# Patient Record
Sex: MALE | Race: WHITE | NOT HISPANIC OR LATINO | Employment: OTHER | URBAN - METROPOLITAN AREA
[De-identification: names, ages, dates, MRNs, and addresses within clinical notes are randomized per-mention and may not be internally consistent; named-entity substitution may affect disease eponyms.]

---

## 2017-01-24 NOTE — TELEPHONE ENCOUNTER
Was the patient seen in the last year in this department? Yes     Does patient have an active prescription for medications requested? Yes     Received Request Via: Pharmacy    Last office visit: 03/31/2016  Last labs: 06/23/2016

## 2017-01-24 NOTE — TELEPHONE ENCOUNTER
Gabapentin    Was the patient seen in the last year in this department? Yes  (Last 7/19/16)  Does patient have an active prescription for medications requested? No     Received Request Via: Pharmacy

## 2017-01-27 RX ORDER — GABAPENTIN 300 MG/1
CAPSULE ORAL
Qty: 180 CAP | Refills: 3 | Status: SHIPPED | OUTPATIENT
Start: 2017-01-27 | End: 2017-05-10

## 2017-01-27 RX ORDER — METFORMIN HYDROCHLORIDE 500 MG/1
TABLET, EXTENDED RELEASE ORAL
Qty: 360 TAB | Refills: 3 | Status: SHIPPED | OUTPATIENT
Start: 2017-01-27 | End: 2017-05-10

## 2017-01-30 ENCOUNTER — TELEPHONE (OUTPATIENT)
Dept: MEDICAL GROUP | Facility: MEDICAL CENTER | Age: 57
End: 2017-01-30

## 2017-01-30 NOTE — TELEPHONE ENCOUNTER
----- Message from Joy Fink PA-C sent at 1/28/2017  1:25 PM PST -----  Please inform pt that he is due for f/u.   We can discuss labs further at his f/u visit.   But in short his cholesterol has increased and his A1c has decreased into a controlled range.   Please schedule a long appt for him so we can review all his chronic conditions.   Thank you  Joy

## 2017-05-10 ENCOUNTER — OFFICE VISIT (OUTPATIENT)
Dept: MEDICAL GROUP | Facility: MEDICAL CENTER | Age: 57
End: 2017-05-10
Payer: COMMERCIAL

## 2017-05-10 ENCOUNTER — NON-PROVIDER VISIT (OUTPATIENT)
Dept: MEDICAL GROUP | Facility: MEDICAL CENTER | Age: 57
End: 2017-05-10
Payer: COMMERCIAL

## 2017-05-10 VITALS
OXYGEN SATURATION: 94 % | DIASTOLIC BLOOD PRESSURE: 62 MMHG | TEMPERATURE: 97.9 F | HEART RATE: 80 BPM | RESPIRATION RATE: 16 BRPM | HEIGHT: 70 IN | WEIGHT: 216.49 LBS | SYSTOLIC BLOOD PRESSURE: 122 MMHG | BODY MASS INDEX: 30.99 KG/M2

## 2017-05-10 DIAGNOSIS — B35.1 ONYCHOMYCOSIS: ICD-10-CM

## 2017-05-10 DIAGNOSIS — E11.40 TYPE 2 DIABETES MELLITUS WITH DIABETIC NEUROPATHY, WITHOUT LONG-TERM CURRENT USE OF INSULIN (HCC): ICD-10-CM

## 2017-05-10 DIAGNOSIS — Z13.0 SCREENING, ANEMIA, DEFICIENCY, IRON: ICD-10-CM

## 2017-05-10 DIAGNOSIS — E78.5 HYPERLIPIDEMIA, UNSPECIFIED HYPERLIPIDEMIA TYPE: ICD-10-CM

## 2017-05-10 PROCEDURE — 92250 FUNDUS PHOTOGRAPHY W/I&R: CPT | Performed by: PHYSICIAN ASSISTANT

## 2017-05-10 PROCEDURE — 99214 OFFICE O/P EST MOD 30 MIN: CPT | Performed by: PHYSICIAN ASSISTANT

## 2017-05-10 ASSESSMENT — PAIN SCALES - GENERAL: PAINLEVEL: NO PAIN

## 2017-05-10 ASSESSMENT — PATIENT HEALTH QUESTIONNAIRE - PHQ9: CLINICAL INTERPRETATION OF PHQ2 SCORE: 0

## 2017-05-10 NOTE — ASSESSMENT & PLAN NOTE
Results for GRICELDA VELASCO (MRN [id number]) as of 5/10/2017 14:01   Ref. Range 1/25/2017 00:00   Cholesterol,Tot Latest Ref Range: 100-199 mg/dL 194   Triglycerides Latest Ref Range: 0-149 mg/dL 122   HDL Latest Ref Range: >39 mg/dL 42   LDL Latest Ref Range: 0-99 mg/dL 128 (H)   Since starting simvastatin- denies any muscle cramping but he is not sure if he is still taking the simvastatin.   Denies sob, cp, lt headedness  Denies tobacco use  Etoh- weekdays- 1 daily, weekend- 6-12 drinks.

## 2017-05-10 NOTE — MR AVS SNAPSHOT
Luiz Melvin   5/10/2017 2:45 PM   Non-Provider Visit   MRN: [id number]    Department:  [facility]   Dept Phone:  [phone]    Description:  Male : 1960   Provider:  [facility]           Reason for Visit     Eye Exam           Allergies as of 5/10/2017     No Known Allergies      Vital Signs     Smoking Status                   Never Smoker            Basic Information     Date Of Birth Sex Race Ethnicity Preferred Language    1960 Male White Non- English      Problem List              ICD-10-CM Priority Class Noted - Resolved    Cataracts, bilateral H26.9   10/13/2014 - Present    Type 2 diabetes mellitus with diabetic neuropathy (CMS-HCC) E11.40   10/13/2014 - Present    Abnormal EKG R94.31   2015 - Present    Hyperlipemia E78.5   10/27/2015 - Present    BMI 30.0-30.9,adult Z68.30   2016 - Present    Onychomycosis B35.1   5/10/2017 - Present      Health Maintenance        Date Due Completion Dates    IMM HEP B VACCINE (1 of 3 - Primary Series) 1960 ---    RETINAL SCREENING 1977 ---    IMM DTaP/Tdap/Td Vaccine (1 - Tdap) 1978 ---    IMM PNEUMOCOCCAL 19-64 (ADULT) MEDIUM RISK SERIES (1 of 1 - PPSV23) 1978 ---    DIABETES MONOFILAMENT / LE EXAM 10/27/2016 10/27/2015    A1C SCREENING 2017, 2016, 2016, 2015    FASTING LIPID PROFILE 2018, 2016, 2015, 3/16/2013    URINE ACR / MICROALBUMIN 2018, 2016, 2015, 3/16/2013    SERUM CREATININE 2018, 2016, 2016, 2015, 2015, 3/16/2013    COLONOSCOPY 2019 (Prv Comp)    Override on 2014: Previously completed            Current Immunizations     No immunizations on file.      Below and/or attached are the medications your provider expects you to take. Review all of your home medications and newly ordered medications with your provider and/or pharmacist. Follow medication instructions  as directed by your provider and/or pharmacist. Please keep your medication list with you and share with your provider. Update the information when medications are discontinued, doses are changed, or new medications (including over-the-counter products) are added; and carry medication information at all times in the event of emergency situations     Allergies:  No Known Allergies          Medications  Valid as of: May 10, 2017 -  3:40 PM    Generic Name Brand Name Tablet Size Instructions for use    Aspirin (Tablet Delayed Response) ECOTRIN 81 MG Take 162 mg by mouth every day.        Blood Glucose Monitoring Suppl   by Does not apply route. Accu Check Daija        Blood Glucose Monitoring Suppl (Misc) Blood Glucose Monitoring Suppl SUPPLIES Test strips order: Test strips for Accucheck Daija meter. Sig: use qd and prn ssx high or low sugar #100 RF x 3        Gabapentin (Cap) NEURONTIN 300 MG TAKE 2 CAPS BY MOUTH EVERY BEDTIME.        GlipiZIDE (Tab) GLUCOTROL 5 MG TAKE 1 TAB BY MOUTH 2 TIMES A DAY.        Lancets (Misc) Lancets  Lancets order: Lancets for Accucheck Daija meter. Sig: use qd and prn ssx high or low sugar. #100 RF x 3        MetFORMIN HCl (TABLET SR 24 HR) GLUCOPHAGE  MG Take 2 Tabs by mouth 2 times a day.        Multiple Vitamins-Minerals   Take  by mouth every day.        Simvastatin (Tab) ZOCOR 20 MG TAKE 1 TAB BY MOUTH EVERY EVENING.        SITagliptin Phosphate (Tab) JANUVIA 100 MG Take 1 Tab by mouth every day.        .                 Medicines prescribed today were sent to:     [id number] - [place], [place] - [address]    [address] [facility] [address]    Phone: [phone] Fax: [phone]    Open 24 Hours?: No    [id number] - [place], [place] - [address]    [address] [facility] [address]    Phone: [phone] Fax: [phone]    Open 24 Hours?: No      Medication refill instructions:       If your prescription bottle indicates you have  medication refills left, it is not necessary to call your provider’s office. Please contact your pharmacy and they will refill your medication.    If your prescription bottle indicates you do not have any refills left, you may request refills at any time through one of the following ways: The online [employer] system (except Urgent Care), by calling your provider’s office, or by asking your pharmacy to contact your provider’s office with a refill request. Medication refills are processed only during regular business hours and may not be available until the next business day. Your provider may request additional information or to have a follow-up visit with you prior to refilling your medication.   *Please Note: Medication refills are assigned a new Rx number when refilled electronically. Your pharmacy may indicate that no refills were authorized even though a new prescription for the same medication is available at the pharmacy. Please request the medicine by name with the pharmacy before contacting your provider for a refill.           [employer] Access Code: Activation code not generated  Current [employer] Status: Active

## 2017-05-10 NOTE — ASSESSMENT & PLAN NOTE
Last A1c: 6.4 1/2017  Medications: metformin 1000mg BID, januvia 100mg qd, glipizide 5mg bid, gabapentin 300mg bid for neuropathy.   ACE: none  Statin: has simvastatin listed but will double check this at home.   ASA: 81 mg qd.   Diet: eggs or smoothie, low carb toast, L- bulk fruits and veggies, D- meat loaf to steak, chicken, some fish. Veggies (has garden)   Exercise: none  Checks feet: yes, no sores  Eye exam: 1 year ago, denies retinopathy  Kidney function:  1/2017  Last microalbumin creatine urine ratio: 3.0 1/2017  Denies polyuria, polydipsia,   + numbness and tingling in extremities  Needs vaccine: has record at home and will be dropping this off.

## 2017-05-10 NOTE — PROGRESS NOTES
Subjective:     Chief Complaint   Patient presents with   • Follow-Up     DM T2 labs med refills     Luiz Velasco is a 57 y.o. male here today for DM, cholesterol and toe nail fungus as listed below    Type 2 diabetes mellitus with diabetic neuropathy  Last A1c: 6.4 1/2017  Medications: metformin 1000mg BID, januvia 100mg qd, glipizide 5mg bid, gabapentin 300mg bid for neuropathy.   ACE: none  Statin: has simvastatin listed but will double check this at home.   ASA: 81 mg qd.   Diet: eggs or smoothie, low carb toast, L- bulk fruits and veggies, D- meat loaf to steak, chicken, some fish. Veggies (has garden)   Exercise: none  Checks feet: yes, no sores  Eye exam: 1 year ago, denies retinopathy  Kidney function:  1/2017  Last microalbumin creatine urine ratio: 3.0 1/2017  Denies polyuria, polydipsia,   + numbness and tingling in extremities  Needs vaccine: has record at home and will be dropping this off.     Hyperlipemia  Results for LUIZ VELASCO (MRN [id number]) as of 5/10/2017 14:01   Ref. Range 1/25/2017 00:00   Cholesterol,Tot Latest Ref Range: 100-199 mg/dL 194   Triglycerides Latest Ref Range: 0-149 mg/dL 122   HDL Latest Ref Range: >39 mg/dL 42   LDL Latest Ref Range: 0-99 mg/dL 128 (H)   Since starting simvastatin- denies any muscle cramping but he is not sure if he is still taking the simvastatin.   Denies sob, cp, lt headedness  Denies tobacco use  Etoh- weekdays- 1 daily, weekend- 6-12 drinks.     Onychomycosis  Only on a couple toe nails but present several years.   denies redness, swelling, abrasions of toes or feet.   Has tried one powder that OTC- didn't help.   Would like to stick to OTC stuff at this point.        Current medicines (including changes today)  Current Outpatient Prescriptions   Medication Sig Dispense Refill   • glipiZIDE (GLUCOTROL) 5 MG Tab TAKE 1 TAB BY MOUTH 2 TIMES A DAY. 180 Tab 0   • gabapentin (NEURONTIN) 300 MG Cap TAKE 2 CAPS BY MOUTH EVERY BEDTIME. 180 Cap 0   •  "sitagliptin (JANUVIA) 100 MG Tab Take 1 Tab by mouth every day. 90 Tab 1   • metformin ER (GLUCOPHAGE XR) 500 MG TABLET SR 24 HR Take 2 Tabs by mouth 2 times a day. 360 Tab 1   • Blood Glucose Monitoring Suppl SUPPLIES Misc Test strips order: Test strips for Accucheck Daija meter. Sig: use qd and prn ssx high or low sugar #100 RF x 3 100 Strip 3   • Lancets Misc Lancets order: Lancets for Accucheck Daija meter. Sig: use qd and prn ssx high or low sugar. #100 RF x 3 100 Each 3   • aspirin EC (ECOTRIN) 81 MG TBEC Take 162 mg by mouth every day.     • Multiple Vitamins-Minerals (MULTIVITAMIN PO) Take  by mouth every day.     • Blood Glucose Monitoring Suppl (BLOOD GLUCOSE METER) by Does not apply route. Accu Check Daija     • simvastatin (ZOCOR) 20 MG Tab TAKE 1 TAB BY MOUTH EVERY EVENING. 30 Tab 11     No current facility-administered medications for this visit.     He  has a past medical history of Diabetes; Hyperlipidemia; Vitamin D insufficiency; and Neuropathy (CMS-HCC).    ROS   No chest pain, no shortness of breath, no abdominal pain       Objective:     Blood pressure 122/62, pulse 80, temperature 36.6 °C (97.9 °F), resp. rate 16, height 1.778 m (5' 10\"), weight 98.2 kg (216 lb 7.9 oz), SpO2 94 %. Body mass index is 31.06 kg/(m^2).   Physical Exam:  Alert, oriented in no acute distress.  Eye contact is good, speech goal directed, affect calm  HEENT: conjunctiva non-injected, sclera non-icteric, PERRL.  Pinna normal. TM pearly gray.   Oral mucous membranes pink and moist with no lesions.  Neck No adenopathy or masses in the neck or supraclavicular regions.  Lungs: clear to auscultation bilaterally with good excursion.  CV: regular rate and rhythm.  Abdomen: soft, nontender, no HSM, No CVAT  Ext: no edema, color normal, peripheral pulses 2+, temperature normal, 2 nails on right and 3 nails on left with mildly yellowish crumbling no erythema, edema, skin intact.        Assessment and Plan:   The following " treatment plan was discussed     1. Type 2 diabetes mellitus with diabetic neuropathy, without long-term current use of insulin (CMS-[facility])  Controlled, continue current regiment, labs ordered. (pt missed his last appt 2/2 our office rescheduling therefore hasn't had his labs done recently)   Getting retinal exam done today.   - POCT Retinal Eye Exam  - COMP METABOLIC PANEL; Future  - HEMOGLOBIN A1C; Future  - MICROALBUMIN CREAT RATIO URINE; Future  - LIPID PROFILE; Future    2. Hyperlipidemia, unspecified hyperlipidemia type  Uncontrolled, patient will be checking at home to see if he is still taking simvastatin or not. He is unsure at this point in time.  - COMP METABOLIC PANEL; Future  - LIPID PROFILE; Future    3. Screening, anemia, deficiency, iron  Labs ordered, will call for results  - CBC WITH DIFFERENTIAL; Future    4. Onychomycosis  Couple toenails, discussed OTC treatments and that is all that patient would like to do.  Recommend changing socks twice daily, using tea tree oil and or Vicks vapor rub, powdering shoes, getting new shoes annually, keeping toes filed vertically and horizontally.     Followup: Return 3-6 mo, for chronic conditions.           Please note that this dictation was created using voice recognition software. I have made every reasonable attempt to correct obvious errors, but I expect that there are errors of grammar and possibly content that I did not discover before finalizing the note.

## 2017-05-10 NOTE — MR AVS SNAPSHOT
"        Luiz Melvin   5/10/2017 1:40 PM   Office Visit   MRN: [id number]    Department:  [place] [address]   Dept Phone:  [phone]    Description:  Male : 1960   Provider:  Joy Fink PA-C           Reason for Visit     Follow-Up DM T2 labs med refills      Allergies as of 5/10/2017     No Known Allergies      You were diagnosed with     Type 2 diabetes mellitus with diabetic neuropathy, without long-term current use of insulin (CMS-HCC)   [1397726]       Hyperlipidemia, unspecified hyperlipidemia type   [4631679]       Screening, anemia, deficiency, iron   [420593]       Onychomycosis   [[id number]]         Vital Signs     Blood Pressure Pulse Temperature Respirations Height Weight    122/62 mmHg 80 36.6 °C (97.9 °F) 16 1.778 m (5' 10\") 98.2 kg (216 lb 7.9 oz)    Body Mass Index Oxygen Saturation Smoking Status             31.06 kg/m2 94% Never Smoker          Basic Information     Date Of Birth Sex Race Ethnicity Preferred Language    1960 Male White Non- English      Your appointments     May 10, 2017  2:45 PM   Non Provider 1 with [facility] [place]    [address]  [facility] [address] [phone]           You will be receiving a confirmation call a few days before your appointment from our automated call confirmation system.              Problem List              ICD-10-CM Priority Class Noted - Resolved    Cataracts, bilateral H26.9   10/13/2014 - Present    Type 2 diabetes mellitus with diabetic neuropathy (CMS-HCC) E11.40   10/13/2014 - Present    Abnormal EKG R94.31   2015 - Present    Hyperlipemia E78.5   10/27/2015 - Present    BMI 30.0-30.9,adult Z68.30   2016 - Present    Onychomycosis B35.1   5/10/2017 - Present      Health Maintenance        Date Due Completion Dates    IMM HEP B VACCINE (1 of 3 - Primary Series) 1960 ---    RETINAL SCREENING 1977 ---    IMM DTaP/Tdap/Td Vaccine (1 - Tdap) " 5/5/1978 ---    IMM PNEUMOCOCCAL 19-64 (ADULT) MEDIUM RISK SERIES (1 of 1 - PPSV23) 5/5/1978 ---    DIABETES MONOFILAMENT / LE EXAM 10/27/2016 10/27/2015    A1C SCREENING 7/25/2017 1/25/2017, 6/23/2016, 2/22/2016, 9/11/2015    FASTING LIPID PROFILE 1/25/2018 1/25/2017, 2/22/2016, 9/11/2015, 3/16/2013    URINE ACR / MICROALBUMIN 1/25/2018 1/25/2017, 6/23/2016, 9/11/2015, 3/16/2013    SERUM CREATININE 1/25/2018 1/25/2017, 6/23/2016, 2/22/2016, 9/11/2015, 6/9/2015, 3/16/2013    COLONOSCOPY 9/13/2019 9/13/2014 (Prv Comp)    Override on 9/13/2014: Previously completed            Current Immunizations     No immunizations on file.      Below and/or attached are the medications your provider expects you to take. Review all of your home medications and newly ordered medications with your provider and/or pharmacist. Follow medication instructions as directed by your provider and/or pharmacist. Please keep your medication list with you and share with your provider. Update the information when medications are discontinued, doses are changed, or new medications (including over-the-counter products) are added; and carry medication information at all times in the event of emergency situations     Allergies:  No Known Allergies          Medications  Valid as of: May 10, 2017 -  2:41 PM    Generic Name Brand Name Tablet Size Instructions for use    Aspirin (Tablet Delayed Response) ECOTRIN 81 MG Take 162 mg by mouth every day.        Blood Glucose Monitoring Suppl   by Does not apply route. Accu Check Daija        Blood Glucose Monitoring Suppl (Misc) Blood Glucose Monitoring Suppl SUPPLIES Test strips order: Test strips for Accucheck Daija meter. Sig: use qd and prn ssx high or low sugar #100 RF x 3        Gabapentin (Cap) NEURONTIN 300 MG TAKE 2 CAPS BY MOUTH EVERY BEDTIME.        GlipiZIDE (Tab) GLUCOTROL 5 MG TAKE 1 TAB BY MOUTH 2 TIMES A DAY.        Lancets (Misc) Lancets  Lancets order: Lancets for Accucheck Daija meter. Sig:  use qd and prn ssx high or low sugar. #100 RF x 3        MetFORMIN HCl (TABLET SR 24 HR) GLUCOPHAGE  MG Take 2 Tabs by mouth 2 times a day.        Multiple Vitamins-Minerals   Take  by mouth every day.        Simvastatin (Tab) ZOCOR 20 MG TAKE 1 TAB BY MOUTH EVERY EVENING.        SITagliptin Phosphate (Tab) JANUVIA 100 MG Take 1 Tab by mouth every day.        .                 Medicines prescribed today were sent to:     [id number] - [place], [place] - [address]    [address] [place] [address]    Phone: [phone] Fax: [phone]    Open 24 Hours?: No    [id number] - [place], [place] - [address]    [address] [place] [address]    Phone: [phone] Fax: [phone]    Open 24 Hours?: No      Medication refill instructions:       If your prescription bottle indicates you have medication refills left, it is not necessary to call your provider’s office. Please contact your pharmacy and they will refill your medication.    If your prescription bottle indicates you do not have any refills left, you may request refills at any time through one of the following ways: The online [employer] system (except Urgent Care), by calling your provider’s office, or by asking your pharmacy to contact your provider’s office with a refill request. Medication refills are processed only during regular business hours and may not be available until the next business day. Your provider may request additional information or to have a follow-up visit with you prior to refilling your medication.   *Please Note: Medication refills are assigned a new Rx number when refilled electronically. Your pharmacy may indicate that no refills were authorized even though a new prescription for the same medication is available at the pharmacy. Please request the medicine by name with the pharmacy before contacting your provider for a refill.        Your To Do List     Future Labs/Procedures Complete By  Expires    CBC WITH DIFFERENTIAL  As directed 5/11/2018    COMP METABOLIC PANEL  As directed 5/11/2018    HEMOGLOBIN A1C  As directed 5/11/2018    LIPID PROFILE  As directed 5/11/2018    MICROALBUMIN CREAT RATIO URINE  As directed 5/11/2018         MyChart Access Code: Activation code not generated  Current MyChart Status: Active

## 2017-05-10 NOTE — PROGRESS NOTES
Luiz Melvin is a 57 y.o. male here for a non-provider visit for Retinal Eye Exam     If abnormal was an in office provider notified today (if so, indicate provider)? No  Routed to PCP? No

## 2017-05-11 DIAGNOSIS — E11.40 TYPE 2 DIABETES MELLITUS WITH DIABETIC NEUROPATHY, WITHOUT LONG-TERM CURRENT USE OF INSULIN (HCC): ICD-10-CM

## 2017-05-11 RX ORDER — GLIPIZIDE 5 MG/1
TABLET ORAL
Qty: 180 TAB | Refills: 1 | Status: SHIPPED | OUTPATIENT
Start: 2017-05-11 | End: 2017-12-01 | Stop reason: SDUPTHER

## 2017-05-11 NOTE — TELEPHONE ENCOUNTER
Was the patient seen in the last year in this department? Yes     Does patient have an active prescription for medications requested? No     Received Request Via: Pharmacy     Last seen: 05/10/2017 Aleks

## 2017-05-19 ENCOUNTER — TELEPHONE (OUTPATIENT)
Dept: MEDICAL GROUP | Facility: MEDICAL CENTER | Age: 57
End: 2017-05-19

## 2017-05-19 NOTE — TELEPHONE ENCOUNTER
----- Message from Joy Fink PA-C sent at 5/19/2017  6:35 AM PDT -----  Please inform pt that his retinal exam was normal  Recommend repeat in 1 year.   Thank you  Joy

## 2017-05-19 NOTE — TELEPHONE ENCOUNTER
Phone Number Called: [phone] (home)       Message: LVM to return call     Left Message for patient to call back: yes

## 2017-09-01 RX ORDER — SIMVASTATIN 20 MG
20 TABLET ORAL
Qty: 30 TAB | Refills: 11 | Status: SHIPPED | OUTPATIENT
Start: 2017-09-01 | End: 2017-11-14 | Stop reason: SINTOL

## 2017-09-01 NOTE — TELEPHONE ENCOUNTER
Was the patient seen in the last year in this department? Yes     Does patient have an active prescription for medications requested? No     Received Request Via: Pharmacy     Last seen: 05/10/2017

## 2017-11-14 ENCOUNTER — OFFICE VISIT (OUTPATIENT)
Dept: MEDICAL GROUP | Facility: LAB | Age: 57
End: 2017-11-14
Payer: COMMERCIAL

## 2017-11-14 VITALS
HEART RATE: 78 BPM | BODY MASS INDEX: 30.08 KG/M2 | HEIGHT: 70 IN | DIASTOLIC BLOOD PRESSURE: 66 MMHG | OXYGEN SATURATION: 92 % | SYSTOLIC BLOOD PRESSURE: 108 MMHG | WEIGHT: 210.1 LBS | TEMPERATURE: 98.1 F

## 2017-11-14 DIAGNOSIS — E11.40 TYPE 2 DIABETES MELLITUS WITH DIABETIC NEUROPATHY, WITHOUT LONG-TERM CURRENT USE OF INSULIN (HCC): ICD-10-CM

## 2017-11-14 DIAGNOSIS — N52.9 ERECTILE DYSFUNCTION, UNSPECIFIED ERECTILE DYSFUNCTION TYPE: ICD-10-CM

## 2017-11-14 LAB
HBA1C MFR BLD: 6.9 % (ref ?–5.8)
INT CON NEG: NORMAL
INT CON POS: NORMAL

## 2017-11-14 PROCEDURE — 83036 HEMOGLOBIN GLYCOSYLATED A1C: CPT | Performed by: PHYSICIAN ASSISTANT

## 2017-11-14 PROCEDURE — 99215 OFFICE O/P EST HI 40 MIN: CPT | Performed by: PHYSICIAN ASSISTANT

## 2017-11-14 RX ORDER — SILDENAFIL 50 MG/1
50 TABLET, FILM COATED ORAL PRN
Qty: 10 TAB | Refills: 3 | Status: SHIPPED | OUTPATIENT
Start: 2017-11-14

## 2017-11-14 RX ORDER — ATORVASTATIN CALCIUM 10 MG/1
10 TABLET, FILM COATED ORAL EVERY EVENING
Qty: 30 TAB | Refills: 3 | Status: SHIPPED | OUTPATIENT
Start: 2017-11-14 | End: 2018-01-14 | Stop reason: SDUPTHER

## 2017-11-14 NOTE — ASSESSMENT & PLAN NOTE
Over the last year has notice a slow decline in erection.  Sometimes difficult to start and difficult to maintain.    He is able to ejaculate, denies back pain, dysuria, hematuria, hesitancy.   Same partner and doesn't feel anything has changed with the status or mentally.   He is DM2 and his A1c went up over the last year from 6.4 to 6.9  Not on any SSRI.   Does have some neuropathy from DM- treating with gabapentin but that been the same for several years.   Has never tried Viagra or Cialis.

## 2017-11-14 NOTE — PROGRESS NOTES
Subjective:     HPI    Chief Complaint   Patient presents with   • Diabetes       Luiz Melvin is a 57 y.o. male who presents for follow up of chronic conditions of diabetes mellitus, hyperlipidemia    RN-CDE notes reviewed including HPI for DM, Hyperlipidemia.  Exercise frequency and limitations reviewed.  Feet symptoms reviewed.  Nutritional status reviewed.  Retinal eye exam history reviewed.    Problem List Items Addressed This Visit        Unprioritized    ED (erectile dysfunction)     Over the last year has notice a slow decline in erection.  Sometimes difficult to start and difficult to maintain.    He is able to ejaculate, denies back pain, dysuria, hematuria, hesitancy.   Same partner and doesn't feel anything has changed with the status or mentally.   He is DM2 and his A1c went up over the last year from 6.4 to 6.9  Not on any SSRI.   Does have some neuropathy from DM- treating with gabapentin but that been the same for several years.   Has never tried Viagra or Cialis.           Relevant Medications    sildenafil citrate (VIAGRA) 50 MG tablet    Type 2 diabetes mellitus with diabetic neuropathy (CMS-[facility])     Also saw DM RN today.   Last A1c: went up from 6.4 to 6.9 today  Medications: metformin 1000mg BID, januvia 100mg qd, glipizide 5mg bid, gabapentin 300mg bid for neuropathy.   The januvia is very expensive and his glucose went up.   ACE: none  Statin: had simvastatin but stopped this and is not sure why. Thinks it was SE. Possibly funny feeling in mouth.    ASA: 81 mg qd.   Diet: stopped eating breakfast just has coffee L- bulk fruits and veggies, D- meat loaf to steak, chicken, some fish. Veggies (has garden)   Exercise: none  Checks feet: yes, no sores  Eye exam: 5/10/17, denies retinopathy  Kidney function:  1/2017  Last microalbumin creatine urine ratio: 3.0 1/2017  Denies polyuria, polydipsia,   + numbness and tingling in extremities  Needs vaccine: has record at home and will be  dropping this off.          Relevant Medications    Dulaglutide 0.75 MG/0.5ML Solution Pen-injector    atorvastatin (LIPITOR) 10 MG Tab    Other Relevant Orders    POCT Hemoglobin A1C (Completed)    COMP METABOLIC PANEL    MICROALBUMIN CREAT RATIO URINE    LIPID PROFILE    HEMOGLOBIN A1C      Other Visit Diagnoses    None.       He indicates that he is feeling well and denies any symptoms referable to the above diagnoses. Specifically denies chest pain, palpitations, dyspnea, orthopnea, PND or peripheral edema. Also denies polyuria, polydipsia, urinary complaints, abdominal complaints, myalgias, numbness, weakness or other related symptoms.     Patient Active Problem List    Diagnosis Date Noted   • ED (erectile dysfunction) 11/14/2017   • Onychomycosis 05/10/2017   • BMI 30.0-30.9,adult 07/19/2016   • Hyperlipemia 10/27/2015   • Abnormal EKG 06/24/2015   • Cataracts, bilateral 10/13/2014   • Type 2 diabetes mellitus with diabetic neuropathy (CMS-HCC) 10/13/2014       Health Maintenance/Immunizations:   Health Maintenance Topics with due status: Overdue       Topic Date Due    IMM HEP B VACCINE 1960    IMM DTaP/Tdap/Td Vaccine 05/05/1978    IMM PNEUMOCOCCAL 19-64 (ADULT) MEDIUM RISK SERIES 05/05/1978    A1C SCREENING 07/25/2017    IMM INFLUENZA 09/01/2017         Current medications including changes today:  Current Outpatient Prescriptions   Medication Sig Dispense Refill   • Dulaglutide 0.75 MG/0.5ML Solution Pen-injector Inject 0.75 mg as instructed every 7 days. 4 PEN 11   • atorvastatin (LIPITOR) 10 MG Tab Take 1 Tab by mouth every evening. 30 Tab 3   • sildenafil citrate (VIAGRA) 50 MG tablet Take 1 Tab by mouth as needed for Erectile Dysfunction. 10 Tab 3   • glipiZIDE (GLUCOTROL) 5 MG Tab TAKE 1 TAB BY MOUTH 2 TIMES A DAY. 180 Tab 1   • gabapentin (NEURONTIN) 300 MG Cap TAKE 2 CAPS BY MOUTH EVERY BEDTIME. 180 Cap 0   • sitagliptin (JANUVIA) 100 MG Tab Take 1 Tab by mouth every day. 90 Tab 1   •  "metformin ER (GLUCOPHAGE XR) 500 MG TABLET SR 24 HR Take 2 Tabs by mouth 2 times a day. 360 Tab 1   • Lancets Misc Lancets order: Lancets for Accucheck Daija meter. Sig: use qd and prn ssx high or low sugar. #100 RF x 3 100 Each 3   • aspirin EC (ECOTRIN) 81 MG TBEC Take 162 mg by mouth every day.     • Multiple Vitamins-Minerals (MULTIVITAMIN PO) Take  by mouth every day.     • Blood Glucose Monitoring Suppl (BLOOD GLUCOSE METER) by Does not apply route. Accu Check Daija     • Blood Glucose Monitoring Suppl SUPPLIES Misc Test strips order: Test strips for Accucheck Daija meter. Sig: use qd and prn ssx high or low sugar #100 RF x 3 100 Strip 3     No current facility-administered medications for this visit.        Allergies: No Known Allergies     Social History   Substance Use Topics   • Smoking status: Never Smoker   • Smokeless tobacco: Never Used   • Alcohol use 3.0 oz/week     6 Cans of beer per week      Comment: moderate       Family History   Problem Relation Age of Onset   • Diabetes Father          ROS  Pertinent ROS findings as above.   All other systems reviewed and are negative except as per HPI.     Objective:     /66   Pulse 78   Temp 36.7 °C (98.1 °F)   Ht 1.778 m (5' 10\")   Wt 95.3 kg (210 lb 1.6 oz)   SpO2 92%   BMI 30.15 kg/m²  Body mass index is 30.15 kg/m².     Alert, oriented in no acute distress.  Eye contact is good, speech goal directed, affect calm.  HEENT: EOMI, PERRL, conjunctiva non-injected, sclera non-icteric.  Nares patent with no significant congestion or drainage.  Natalie pinnae, external auditory canals, TM pearly gray with normal light reflex bilaterally.  Oral mucous membranes pink and moist with no lesions or thrush.  Neck supple with no cervical lymphadenopathy, JVD, palpable thyroid nodules or carotid bruits.  Lungs: clear to auscultation bilaterally with good excursion.  CV: regular rate and rhythm.  Abdomen soft, non-distended, non-tender with normal bowel sounds. " No CVAT  Lower extremities warm with no edema.  Feet are examined today, no sores or lesions noted.     Lab Results   Component Value Date/Time    HBA1C 6.9 11/14/2017 02:36 PM      Assessment/Plan:       1. Type 2 diabetes mellitus with diabetic neuropathy, without long-term current use of insulin (CMS-HCC)  POCT Hemoglobin A1C   Controlled although increased since last tested.   We will switch from januvia to trulicity. Continue with metformin, glipizide. Discussed diet changes in depth with DM RN. Pt wants to start walking more.   Will retry statin. Rx given for atorvastatin 10mg qd.   This can contribute to ED.  COMP METABOLIC PANEL    MICROALBUMIN CREAT RATIO URINE    LIPID PROFILE    Dulaglutide 0.75 MG/0.5ML Solution Pen-injector    atorvastatin (LIPITOR) 10 MG Tab    HEMOGLOBIN A1C   2. Erectile dysfunction, unspecified erectile dysfunction type   Possibly from DM2. Especially since worse over last year and his A1c increased as well.   Discussed working on diet and exercise.   Pt has no back pain.   We will do a trial of viagra. discussed all SE but especially watching for hypotension, facial flushing. Rx given and pt given map to [facility] for sample.   We are retrying a statin medication- this can affect impotence,  He is also on gabapentin but that has been stable for years.  sildenafil citrate (VIAGRA) 50 MG tablet       DM2 A1c is at goal     -RN-CDE notes reviewed and discussed.  -Discussed diet, exercise, disease management and weight loss goals.   -Education and advice provided today: See RN-CDE note.    Additional education, advice, refills, and referrals per order    Follow-up:   Return in about 3 months (around 2/14/2018) for DM and DM RN. sooner should new symptoms or problems arise.    The total time spent seeing the patient in consultation, and formulating an action plan for this visit was 40+ minutes.  Greater than 50% of this time was spent counseling, discussing problems documented  above, coordinating care and answering questions by the provider and registered nurse--certified diabetes educator.

## 2017-11-14 NOTE — ASSESSMENT & PLAN NOTE
Also saw DM RN today.   Last A1c: went up from 6.4 to 6.9 today  Medications: metformin 1000mg BID, januvia 100mg qd, glipizide 5mg bid, gabapentin 300mg bid for neuropathy.   The januvia is very expensive and his glucose went up.   ACE: none  Statin: had simvastatin but stopped this and is not sure why. Thinks it was SE. Possibly funny feeling in mouth.    ASA: 81 mg qd.   Diet: stopped eating breakfast just has coffee L- bulk fruits and veggies, D- meat loaf to steak, chicken, some fish. Veggies (has garden)   Exercise: none  Checks feet: yes, no sores  Eye exam: 5/10/17, denies retinopathy  Kidney function:  1/2017  Last microalbumin creatine urine ratio: 3.0 1/2017  Denies polyuria, polydipsia,   + numbness and tingling in extremities  Needs vaccine: has record at home and will be dropping this off.

## 2017-12-01 DIAGNOSIS — E11.40 TYPE 2 DIABETES MELLITUS WITH DIABETIC NEUROPATHY, WITHOUT LONG-TERM CURRENT USE OF INSULIN (HCC): ICD-10-CM

## 2017-12-01 RX ORDER — GLIPIZIDE 5 MG/1
5 TABLET ORAL 2 TIMES DAILY
Qty: 180 TAB | Refills: 1 | Status: SHIPPED | OUTPATIENT
Start: 2017-12-01 | End: 2018-06-13 | Stop reason: SDUPTHER

## 2018-01-14 DIAGNOSIS — E11.40 TYPE 2 DIABETES MELLITUS WITH DIABETIC NEUROPATHY, WITHOUT LONG-TERM CURRENT USE OF INSULIN (HCC): ICD-10-CM

## 2018-01-15 RX ORDER — ATORVASTATIN CALCIUM 10 MG/1
10 TABLET, FILM COATED ORAL EVERY EVENING
Qty: 90 TAB | Refills: 3 | Status: SHIPPED | OUTPATIENT
Start: 2018-01-15 | End: 2018-08-28 | Stop reason: SDUPTHER

## 2018-01-15 NOTE — TELEPHONE ENCOUNTER
Was the patient seen in the last year in this department? Yes Lov 11/14/2017    Does patient have an active prescription for medications requested? No     Received Request Via: Pharmacy

## 2018-03-20 DIAGNOSIS — E11.40 TYPE 2 DIABETES MELLITUS WITH DIABETIC NEUROPATHY, WITHOUT LONG-TERM CURRENT USE OF INSULIN (HCC): ICD-10-CM

## 2018-03-20 RX ORDER — METFORMIN HYDROCHLORIDE 500 MG/1
TABLET, EXTENDED RELEASE ORAL
Qty: 120 TAB | Refills: 1 | Status: SHIPPED | OUTPATIENT
Start: 2018-03-20 | End: 2018-05-08

## 2018-03-21 NOTE — TELEPHONE ENCOUNTER
Pt is past due for labs. Please ask him to get these prior to further refills.   Thank you  Joy

## 2018-05-05 LAB
ALBUMIN SERPL-MCNC: 4.2 G/DL (ref 3.5–5.5)
ALBUMIN/CREAT UR: 11.4 MG/G CREAT (ref 0–30)
ALBUMIN/GLOB SERPL: 1.6 {RATIO} (ref 1.2–2.2)
ALP SERPL-CCNC: 86 IU/L (ref 39–117)
ALT SERPL-CCNC: 22 IU/L (ref 0–44)
AST SERPL-CCNC: 17 IU/L (ref 0–40)
BILIRUB SERPL-MCNC: 0.6 MG/DL (ref 0–1.2)
BUN SERPL-MCNC: 17 MG/DL (ref 6–24)
BUN/CREAT SERPL: 23 (ref 9–20)
CALCIUM SERPL-MCNC: 9.2 MG/DL (ref 8.7–10.2)
CHLORIDE SERPL-SCNC: 101 MMOL/L (ref 96–106)
CHOLEST SERPL-MCNC: 155 MG/DL (ref 100–199)
CO2 SERPL-SCNC: 26 MMOL/L (ref 18–29)
CREAT SERPL-MCNC: 0.73 MG/DL (ref 0.76–1.27)
CREAT UR-MCNC: 85.2 MG/DL
GFR SERPLBLD CREATININE-BSD FMLA CKD-EPI: 102 ML/MIN/1.73
GFR SERPLBLD CREATININE-BSD FMLA CKD-EPI: 118 ML/MIN/1.73
GLOBULIN SER CALC-MCNC: 2.7 G/DL (ref 1.5–4.5)
GLUCOSE SERPL-MCNC: 133 MG/DL (ref 65–99)
HBA1C MFR BLD: 6 % (ref 4.8–5.6)
HDLC SERPL-MCNC: 46 MG/DL
LABORATORY COMMENT REPORT: NORMAL
LDLC SERPL CALC-MCNC: 95 MG/DL (ref 0–99)
MICROALBUMIN UR-MCNC: 9.7 UG/ML
POTASSIUM SERPL-SCNC: 4.5 MMOL/L (ref 3.5–5.2)
PROT SERPL-MCNC: 6.9 G/DL (ref 6–8.5)
SODIUM SERPL-SCNC: 142 MMOL/L (ref 134–144)
TRIGL SERPL-MCNC: 70 MG/DL (ref 0–149)
VLDLC SERPL CALC-MCNC: 14 MG/DL (ref 5–40)

## 2018-05-06 DIAGNOSIS — E11.40 TYPE 2 DIABETES MELLITUS WITH DIABETIC NEUROPATHY, WITHOUT LONG-TERM CURRENT USE OF INSULIN (HCC): ICD-10-CM

## 2018-05-07 RX ORDER — GABAPENTIN 300 MG/1
CAPSULE ORAL
Qty: 180 CAP | Refills: 1 | Status: SHIPPED | OUTPATIENT
Start: 2018-05-07 | End: 2018-11-08 | Stop reason: SDUPTHER

## 2018-05-07 NOTE — PROGRESS NOTES
RN-VINAY Note    Subjective:     Health changes since last visit/interval Hx: Patient had no side effects of Truliciy, not testing FSBG, rare hypoglycemic symptoms.  He has noticed his feet feel much better since starting Trulicity, needs gabapentin less often.  He has lost 2 pounds    Medications (including changes made today)  Current Outpatient Prescriptions   Medication Sig Dispense Refill   • gabapentin (NEURONTIN) 300 MG Cap TAKE 2 CAPSULES BY MOUTH AT BEDTIME. 180 Cap 1   • glipiZIDE (GLUCOTROL) 5 MG Tab Take 1 Tab by mouth 2 times a day. TAKE 1 TAB BY MOUTH 2 TIMES A DAY. 180 Tab 1   • metformin ER (GLUCOPHAGE XR) 500 MG TABLET SR 24 HR Take 2 Tabs by mouth 2 times a day. 360 Tab 1   • aspirin EC (ECOTRIN) 81 MG TBEC Take 162 mg by mouth every day.     • Multiple Vitamins-Minerals (MULTIVITAMIN PO) Take  by mouth every day.     • atorvastatin (LIPITOR) 10 MG Tab TAKE 1 TAB BY MOUTH EVERY EVENING. 90 Tab 3   • Dulaglutide 0.75 MG/0.5ML Solution Pen-injector Inject 0.75 mg as instructed every 7 days. 4 PEN 11   • sildenafil citrate (VIAGRA) 50 MG tablet Take 1 Tab by mouth as needed for Erectile Dysfunction. 10 Tab 3   • Blood Glucose Monitoring Suppl SUPPLIES Misc Test strips order: Test strips for Accucheck Daija meter. Sig: use qd and prn ssx high or low sugar #100 RF x 3 100 Strip 3   • Lancets Misc Lancets order: Lancets for Accucheck Daija meter. Sig: use qd and prn ssx high or low sugar. #100 RF x 3 100 Each 3     No current facility-administered medications for this visit.        Taking daily ASA: Yes  Taking above medications as prescribed: Yes  Patient Denies side effects of medication.    Exercise: physical job 5 days/week  Diet: meals per day on average: 3, no snack    Health Maintenance:   Health Maintenance Topics with due status: Overdue       Topic Date Due    IMM HEP B VACCINE 1960    IMM DTaP/Tdap/Td Vaccine 05/05/1978    IMM PNEUMOCOCCAL 19-64 (ADULT) MEDIUM RISK SERIES 05/05/1978          DM:   Last A1c:   Lab Results   Component Value Date/Time    HBA1C 6.0 (H) 05/04/2018 08:59 AM    HBA1C 6.9 11/14/2017 02:36 PM      A1c goal: < 7    Glucose monitoring frequency: none  Hypoglycemic episodes: yes - rare     Last Retinal Exam: 5/10/17- Due soon, reminded to schedule  Daily Foot Exam: no  Routine Dental Exams: yes    Lab Results   Component Value Date/Time    MICROALBCALC 3.0 01/25/2017 12:00 AM    MICROALBCALC 9.5 03/16/2013    MICRALB 9.7 05/04/2018 08:59 AM        ACR Albumin/Creatinine Ratio goal <30 no, but improving    Diabetic complications: peripheral neuropathy and albinurea    HTN:   Blood pressure goal <140/<80 yes.   Currently Rx ACE/ARB: No    Dyslipidemia:    Lab Results   Component Value Date/Time    CHOLSTRLTOT 155 05/04/2018 08:59 AM    CHOLSTRLTOT 139 03/16/2013    LDL 95 05/04/2018 08:59 AM    LDL 73 03/16/2013    HDL 46 05/04/2018 08:59 AM    HDL 45 03/16/2013    TRIGLYCERIDE 70 05/04/2018 08:59 AM    TRIGLYCERIDE 106 03/16/2013       Lab Results   Component Value Date/Time    SODIUM 142 05/04/2018 08:59 AM    SODIUM 137 06/09/2015 12:00 PM    POTASSIUM 4.5 05/04/2018 08:59 AM    POTASSIUM 3.9 06/09/2015 12:00 PM    CHLORIDE 101 05/04/2018 08:59 AM    CHLORIDE 104 06/09/2015 12:00 PM    CO2 26 05/04/2018 08:59 AM    CO2 24 06/09/2015 12:00 PM    GLUCOSE 133 (H) 05/04/2018 08:59 AM    GLUCOSE 266 (H) 06/09/2015 12:00 PM    BUN 17 05/04/2018 08:59 AM    BUN 14 06/09/2015 12:00 PM    CREATININE 0.73 (L) 05/04/2018 08:59 AM    CREATININE 0.56 06/09/2015 12:00 PM    CREATININE 0.74 03/16/2013    BUNCREATRAT 23 (H) 05/04/2018 08:59 AM     Lab Results   Component Value Date/Time    ALKPHOSPHAT 86 05/04/2018 08:59 AM    ALKPHOSPHAT 104 (H) 06/09/2015 12:00 PM    ASTSGOT 17 05/04/2018 08:59 AM    ASTSGOT 26 06/09/2015 12:00 PM    ALTSGPT 22 05/04/2018 08:59 AM    ALTSGPT 42 06/09/2015 12:00 PM    TBILIRUBIN 0.6 05/04/2018 08:59 AM    TBILIRUBIN 0.9 06/09/2015 12:00 PM         Currently Rx Statin: Yes      He  reports that he has never smoked. He has never used smokeless tobacco.    Objective:     Exam:  Monofilament: done  Monofilament testing with a 10 gram force: sensation intact: decreased bilaterally  Visual Inspection: Feet with maceration, ulcers, fissures.- healing blister on the bottom of right great toe, open sore on second toe from great toenail digging into skin  Pedal pulses: intact bilaterally      Plan:     Discussed All medications, side effects and compliance (discussed carefully)  Annual eye examinations at Ophthalmology  Diabetic diet discussed in detail, written exchange diet given  Foot care discussed and Podiatry visits  Glycohemoglobin and other lab monitoring  Home glucose monitoring emphasized.   Patient educated on Interpretation of lab results, Blood Sugar Goals, Exercise, Nutrition  Plate method  Recommended medication changes: Increase trulicity, stop glipizide, consider starting an ACEI for elevated UACR, consider adding vitamin D3

## 2018-05-08 ENCOUNTER — OFFICE VISIT (OUTPATIENT)
Dept: MEDICAL GROUP | Facility: LAB | Age: 58
End: 2018-05-08
Payer: COMMERCIAL

## 2018-05-08 VITALS
BODY MASS INDEX: 29.78 KG/M2 | OXYGEN SATURATION: 97 % | HEART RATE: 77 BPM | TEMPERATURE: 98.2 F | SYSTOLIC BLOOD PRESSURE: 122 MMHG | DIASTOLIC BLOOD PRESSURE: 74 MMHG | RESPIRATION RATE: 18 BRPM | WEIGHT: 208 LBS | HEIGHT: 70 IN

## 2018-05-08 DIAGNOSIS — E11.40 TYPE 2 DIABETES MELLITUS WITH DIABETIC NEUROPATHY, WITHOUT LONG-TERM CURRENT USE OF INSULIN (HCC): ICD-10-CM

## 2018-05-08 DIAGNOSIS — B35.1 ONYCHOMYCOSIS: ICD-10-CM

## 2018-05-08 DIAGNOSIS — E78.5 HYPERLIPIDEMIA, UNSPECIFIED HYPERLIPIDEMIA TYPE: ICD-10-CM

## 2018-05-08 DIAGNOSIS — L84 CALLUS OF FOOT: ICD-10-CM

## 2018-05-08 PROCEDURE — 99215 OFFICE O/P EST HI 40 MIN: CPT | Performed by: PHYSICIAN ASSISTANT

## 2018-05-08 NOTE — ASSESSMENT & PLAN NOTE
Has been trying to treat with OTC antifungal cream.   Was helping a little but still there.   Now has sore on right 2nd toe.   Has been treating with bandage with triple antibiotic ointment for 1 mo.   Has been healing a little.   Denies redness, drainage, pain.   Does have a little neuropathy and treats with gabapentin but that has improved with the decreased A1c.

## 2018-05-08 NOTE — ASSESSMENT & PLAN NOTE
Also saw DM RN today.   Last A1c: improved from 6.9 to 6.0 today  Medications: metformin 1000mg BID, Trulicity 0.75mg every 7 days, glipizide 5mg bid, gabapentin 300mg bid for neuropathy.   D/c januvia 100mg qd- very expensive and his glucose went up.   ACE: none  Statin: atorvastatin     ASA: 81 mg qd.   Diet: started having breakfast, L- bulk fruits and veggies, D- meat loaf to steak, chicken, some fish. 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   Checks feet: yes, sores on toe.   Eye exam: 5/10/17, denies retinopathy  Kidney function:  5/4/18  Last microalbumin creatine urine ratio: 11.4 5/4/18  Denies polyuria, polydipsia,   + numbness and tingling in extremities

## 2018-05-08 NOTE — ASSESSMENT & PLAN NOTE
This is chronic. Doing well.  Taking atorvastatin 10mg nightly as prescribed. No myalgias, GI upset, or other side effects from medication. Denies CP or stroke symptoms. Also taking a daily ASA.   Results for GRICELDA VELASCO (MRN [id number]) as of 5/8/2018 10:21   Ref. Range 5/4/2018 08:59   Cholesterol,Tot Latest Ref Range: 100 - 199 mg/dL 155   Triglycerides Latest Ref Range: 0 - 149 mg/dL 70   HDL Latest Ref Range: >39 mg/dL 46   LDL Latest Ref Range: 0 - 99 mg/dL 95   Denies tobacco use  etoh- weekdays- 1 daily and weekends- 0-6 drinks.

## 2018-05-09 NOTE — PROGRESS NOTES
Subjective:     HPI    Chief Complaint   Patient presents with   • Diabetes       Luiz Velasco is a 58 y.o. male who presents for follow up of chronic conditions of diabetes mellitus, hypertension, hyperlipidemia, .    RN-CDE notes reviewed including HPI for DM, Hyperlipidemia.  Exercise frequency and limitations reviewed.  Feet symptoms reviewed.  Nutritional status reviewed.  Retinal eye exam history reviewed.    Patient additionally reports:  Type 2 diabetes mellitus with diabetic neuropathy  Also saw DM RN today.   Last A1c: improved from 6.9 to 6.0 today  Medications: metformin 1000mg BID, Trulicity 0.75mg every 7 days, glipizide 5mg bid, gabapentin 300mg bid for neuropathy.   D/c januvia 100mg qd- very expensive and his glucose went up.   ACE: none  Statin: atorvastatin     ASA: 81 mg qd.   Diet: started having breakfast, L- bulk fruits and veggies, D- meat loaf to steak, chicken, some fish. 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   Checks feet: yes, sores on toe.   Eye exam: 5/10/17, denies retinopathy  Kidney function:  5/4/18  Last microalbumin creatine urine ratio: 11.4 5/4/18  Denies polyuria, polydipsia,   + numbness and tingling in extremities    Hyperlipemia  This is chronic. Doing well.  Taking atorvastatin 10mg nightly as prescribed. No myalgias, GI upset, or other side effects from medication. Denies CP or stroke symptoms. Also taking a daily ASA.   Results for LUIZ VELASCO (MRN [id number]) as of 5/8/2018 10:21   Ref. Range 5/4/2018 08:59   Cholesterol,Tot Latest Ref Range: 100 - 199 mg/dL 155   Triglycerides Latest Ref Range: 0 - 149 mg/dL 70   HDL Latest Ref Range: >39 mg/dL 46   LDL Latest Ref Range: 0 - 99 mg/dL 95   Denies tobacco use  etoh- weekdays- 1 daily and weekends- 0-6 drinks.     Onychomycosis  Has been trying to treat with OTC antifungal cream.   Was helping a little but still there.   Now has sore on right 2nd toe.   Has been treating with bandage  with triple antibiotic ointment for 1 mo.   Has been healing a little.   Denies redness, drainage, pain.   Does have a little neuropathy and treats with gabapentin but that has improved with the decreased A1c.     He indicates that he is feeling well and denies any symptoms referable to the above diagnoses. Specifically denies chest pain, palpitations, dyspnea, orthopnea, PND or peripheral edema. Also denies polyuria, polydipsia, urinary complaints, abdominal complaints, myalgias, numbness, weakness or other related symptoms.     Patient Active Problem List    Diagnosis Date Noted   • ED (erectile dysfunction) 11/14/2017   • Onychomycosis 05/10/2017   • BMI 30.0-30.9,adult 07/19/2016   • Hyperlipemia 10/27/2015   • Abnormal EKG 06/24/2015   • Cataracts, bilateral 10/13/2014   • Type 2 diabetes mellitus with diabetic neuropathy (HCC) 10/13/2014       Health Maintenance/Immunizations:   Health Maintenance Topics with due status: Overdue       Topic Date Due    IMM HEP B VACCINE 1960    IMM DTaP/Tdap/Td Vaccine 05/05/1978    IMM PNEUMOCOCCAL 19-64 (ADULT) MEDIUM RISK SERIES 05/05/1978         Current medications including changes today:  Current Outpatient Prescriptions   Medication Sig Dispense Refill   • Dulaglutide (TRULICITY) 1.5 MG/0.5ML Solution Pen-injector Inject 1.5 mg as instructed every 7 days. 12 PEN 3   • gabapentin (NEURONTIN) 300 MG Cap TAKE 2 CAPSULES BY MOUTH AT BEDTIME. 180 Cap 1   • glipiZIDE (GLUCOTROL) 5 MG Tab Take 1 Tab by mouth 2 times a day. TAKE 1 TAB BY MOUTH 2 TIMES A DAY. 180 Tab 1   • metformin ER (GLUCOPHAGE XR) 500 MG TABLET SR 24 HR Take 2 Tabs by mouth 2 times a day. 360 Tab 1   • aspirin EC (ECOTRIN) 81 MG TBEC Take 162 mg by mouth every day.     • Multiple Vitamins-Minerals (MULTIVITAMIN PO) Take  by mouth every day.     • atorvastatin (LIPITOR) 10 MG Tab TAKE 1 TAB BY MOUTH EVERY EVENING. 90 Tab 3   • Dulaglutide 0.75 MG/0.5ML Solution Pen-injector Inject 0.75 mg as instructed  "every 7 days. 4 PEN 11   • sildenafil citrate (VIAGRA) 50 MG tablet Take 1 Tab by mouth as needed for Erectile Dysfunction. 10 Tab 3   • Blood Glucose Monitoring Suppl SUPPLIES Misc Test strips order: Test strips for Accucheck Daija meter. Sig: use qd and prn ssx high or low sugar #100 RF x 3 100 Strip 3   • Lancets Misc Lancets order: Lancets for Accucheck Daija meter. Sig: use qd and prn ssx high or low sugar. #100 RF x 3 100 Each 3     No current facility-administered medications for this visit.        Allergies: No Known Allergies     Social History   Substance Use Topics   • Smoking status: Never Smoker   • Smokeless tobacco: Never Used   • Alcohol use 3.0 oz/week     6 Cans of beer per week      Comment: moderate       Family History   Problem Relation Age of Onset   • Diabetes Father          ROS  Pertinent ROS findings as above.   All other systems reviewed and are negative except as per HPI.     Objective:     /74   Pulse 77   Temp 36.8 °C (98.2 °F)   Resp 18   Ht 1.778 m (5' 10\")   Wt 94.3 kg (208 lb)   SpO2 97%   BMI 29.84 kg/m²  Body mass index is 29.84 kg/m².     Alert, oriented in no acute distress.  Eye contact is good, speech goal directed, affect calm.  HEENT: EOMI, PERRL, conjunctiva non-injected, sclera non-icteric.  Nares patent with no significant congestion or drainage.  Natalie pinnae, external auditory canals, TM pearly gray with normal light reflex bilaterally.  Oral mucous membranes pink and moist with no lesions or thrush.  Neck supple with no cervical lymphadenopathy, JVD, palpable thyroid nodules or carotid bruits.  Lungs: clear to auscultation bilaterally with good excursion.  CV: regular rate and rhythm.  Abdomen soft, non-distended, non-tender with normal bowel sounds. No CVAT  Lower extremities warm with no edema.  Feet are examined thickened crumbing toe nails, right 2nd toe with approx 5mm callus purpuric, no erythema, edema, warmth.     Lab Results   Component Value " Date/Time    HBA1C 6.0 (H) 05/04/2018 08:59 AM    HBA1C 6.9 11/14/2017 02:36 PM          Assessment/Plan:       1. Type 2 diabetes mellitus with diabetic neuropathy, without long-term current use of insulin (HCC)  Diabetic Monofilament LE Exam   Controlled, although we are trying to d/c glipizide so we will increase trulicity to 1.5mg q7d, continue metformin 1000mg BID.   Discussed importance of continuing to monitor BS.   Pt has a callus and his great toe is rubbing on 2nd toe.   Pt requesting to see podiatry. Referral sent and discussed foot care provided at the [facility].   Discussed his nail fungus but pt wants to try one more otc medication first and then if not helping talk with podiatrist.  Dulaglutide (TRULICITY) 1.5 MG/0.5ML Solution Pen-injector    REFERRAL TO PODIATRY    BASIC METABOLIC PANEL    HEMOGLOBIN A1C   2. Hyperlipidemia, unspecified hyperlipidemia type   Stable, continue current treatment    3. Onychomycosis   Same as #1 REFERRAL TO PODIATRY   4. Callus of foot   Same as #1, discussed sxs of infection.  REFERRAL TO PODIATRY       DM2 A1c is at goal     -RN-CDE notes reviewed and discussed.  -Patient's body mass index is 29.84 kg/m². Exercise and nutrition counseling were performed at this visit.   Additionally discussed disease management and weight loss goals.   -Education and advice provided today: See RN-CDE note.    Additional education, advice, refills, and referrals per order    Follow-up:   Return in about 3 months (around 8/8/2018) for DM with DM RN. sooner should new symptoms or problems arise.    The total time spent seeing the patient in consultation, and formulating an action plan for this visit was 40+ minutes.  Greater than 50% of this time was spent counseling, discussing problems documented above, coordinating care and answering questions by the provider and registered nurse--certified diabetes educator.

## 2018-06-13 DIAGNOSIS — E11.40 TYPE 2 DIABETES MELLITUS WITH DIABETIC NEUROPATHY, WITHOUT LONG-TERM CURRENT USE OF INSULIN (HCC): ICD-10-CM

## 2018-06-14 RX ORDER — GLIPIZIDE 5 MG/1
5 TABLET ORAL 2 TIMES DAILY
Qty: 180 TAB | Refills: 2 | Status: SHIPPED | OUTPATIENT
Start: 2018-06-14 | End: 2019-07-29 | Stop reason: SDUPTHER

## 2018-06-20 DIAGNOSIS — E11.40 TYPE 2 DIABETES MELLITUS WITH DIABETIC NEUROPATHY (HCC): ICD-10-CM

## 2018-06-20 RX ORDER — METFORMIN HYDROCHLORIDE 500 MG/1
TABLET, EXTENDED RELEASE ORAL
Qty: 360 TAB | Refills: 0 | Status: SHIPPED | OUTPATIENT
Start: 2018-06-20 | End: 2018-10-09 | Stop reason: SDUPTHER

## 2018-08-28 ENCOUNTER — OFFICE VISIT (OUTPATIENT)
Dept: MEDICAL GROUP | Facility: LAB | Age: 58
End: 2018-08-28
Payer: COMMERCIAL

## 2018-08-28 ENCOUNTER — TELEPHONE (OUTPATIENT)
Dept: MEDICAL GROUP | Facility: LAB | Age: 58
End: 2018-08-28

## 2018-08-28 VITALS
RESPIRATION RATE: 16 BRPM | SYSTOLIC BLOOD PRESSURE: 126 MMHG | TEMPERATURE: 98.4 F | DIASTOLIC BLOOD PRESSURE: 68 MMHG | WEIGHT: 212.2 LBS | BODY MASS INDEX: 30.38 KG/M2 | HEART RATE: 71 BPM | OXYGEN SATURATION: 95 % | HEIGHT: 70 IN

## 2018-08-28 DIAGNOSIS — E78.5 HYPERLIPIDEMIA, UNSPECIFIED HYPERLIPIDEMIA TYPE: ICD-10-CM

## 2018-08-28 DIAGNOSIS — E11.40 TYPE 2 DIABETES MELLITUS WITH DIABETIC NEUROPATHY, WITHOUT LONG-TERM CURRENT USE OF INSULIN (HCC): ICD-10-CM

## 2018-08-28 DIAGNOSIS — Z23 NEED FOR VACCINATION: ICD-10-CM

## 2018-08-28 LAB
BUN SERPL-MCNC: 13 MG/DL (ref 6–24)
BUN/CREAT SERPL: 16 (ref 9–20)
CALCIUM SERPL-MCNC: 9.3 MG/DL (ref 8.7–10.2)
CHLORIDE SERPL-SCNC: 101 MMOL/L (ref 96–106)
CO2 SERPL-SCNC: 26 MMOL/L (ref 20–29)
CREAT SERPL-MCNC: 0.83 MG/DL (ref 0.76–1.27)
GLUCOSE SERPL-MCNC: 112 MG/DL (ref 65–99)
HBA1C MFR BLD: 6.2 % (ref 4.8–5.6)
IF AFRICAN AMERICAN  100797: 111 ML/MIN/1.73
IF NON AFRICAN AMER 100791: 96 ML/MIN/1.73
POTASSIUM SERPL-SCNC: 4.9 MMOL/L (ref 3.5–5.2)
SODIUM SERPL-SCNC: 139 MMOL/L (ref 134–144)

## 2018-08-28 PROCEDURE — 99204 OFFICE O/P NEW MOD 45 MIN: CPT | Mod: 25 | Performed by: INTERNAL MEDICINE

## 2018-08-28 PROCEDURE — 90670 PCV13 VACCINE IM: CPT | Performed by: INTERNAL MEDICINE

## 2018-08-28 PROCEDURE — 90471 IMMUNIZATION ADMIN: CPT | Performed by: INTERNAL MEDICINE

## 2018-08-28 RX ORDER — ATORVASTATIN CALCIUM 10 MG/1
10 TABLET, FILM COATED ORAL EVERY EVENING
Qty: 90 TAB | Refills: 3 | Status: SHIPPED | OUTPATIENT
Start: 2018-08-28 | End: 2019-09-26 | Stop reason: SDUPTHER

## 2018-08-28 NOTE — ASSESSMENT & PLAN NOTE
New to me, chronic controlled problem.  This patient has been diabetic since 2012, he is currently on Trulicity 1.5, metformin 500 extended release once daily as well as glipizide 5 mg daily.  His A1c change from 6.0-6.2 but he has gained weight.  Has not changed his diet.  Compliant and exercises on a regular basis.  Would like to be switched off glipizide to guardians in order to lose weight.  - Retinal exam: ordered today  - Proteinuria testing: Normal 5/2018. Not currently on ACEi.  - ASCVD risk: On atorvastatin 10 mg daily.  - Neuropathy: Mono-filament 5/2018, on gabapentin.

## 2018-08-28 NOTE — ASSESSMENT & PLAN NOTE
This is a chronic controlled problem.   We discussed the results of his most recent lipid panel as below.  He has been taking atorvastatin 10 mg daily but has stopped for the last couple of months because he thought he needs to be off this prescription.  He denies any side effects from statin therapy.  He is also diabetic which increases his ASCVD risk.  Lab Results   Component Value Date/Time    CHOLSTRLTOT 155 05/04/2018 08:59 AM    CHOLSTRLTOT 194 01/25/2017 12:00 AM    CHOLSTRLTOT 139 03/16/2013    LDL 95 05/04/2018 08:59 AM     (H) 01/25/2017 12:00 AM    LDL 73 03/16/2013    HDL 46 05/04/2018 08:59 AM    HDL 42 01/25/2017 12:00 AM    HDL 45 03/16/2013    TRIGLYCERIDE 70 05/04/2018 08:59 AM    TRIGLYCERIDE 122 01/25/2017 12:00 AM    TRIGLYCERIDE 106 03/16/2013

## 2018-08-28 NOTE — PROGRESS NOTES
RN-VINAY Note    Subjective:     Health changes since last visit/interval Hx: Luiz has gained 4 pounds since May    Medications (including changes made today)  Current Outpatient Prescriptions   Medication Sig Dispense Refill   • metFORMIN ER (GLUCOPHAGE XR) 500 MG TABLET SR 24 HR TAKE 2 TABLETS BY MOUTH TWICE A  Tab 0   • glipiZIDE (GLUCOTROL) 5 MG Tab TAKE 1 TAB BY MOUTH 2 TIMES A DAY. TAKE 1 TAB BY MOUTH 2 TIMES A DAY. 180 Tab 2   • Dulaglutide (TRULICITY) 1.5 MG/0.5ML Solution Pen-injector Inject 1.5 mg as instructed every 7 days. 12 PEN 3   • gabapentin (NEURONTIN) 300 MG Cap TAKE 2 CAPSULES BY MOUTH AT BEDTIME. 180 Cap 1   • aspirin EC (ECOTRIN) 81 MG TBEC Take 162 mg by mouth every day.     • Multiple Vitamins-Minerals (MULTIVITAMIN PO) Take  by mouth every day.     • atorvastatin (LIPITOR) 10 MG Tab TAKE 1 TAB BY MOUTH EVERY EVENING. 90 Tab 3   • sildenafil citrate (VIAGRA) 50 MG tablet Take 1 Tab by mouth as needed for Erectile Dysfunction. 10 Tab 3   • Blood Glucose Monitoring Suppl SUPPLIES Misc Test strips order: Test strips for Accucheck Daija meter. Sig: use qd and prn ssx high or low sugar #100 RF x 3 100 Strip 3   • Lancets Misc Lancets order: Lancets for Accucheck Daija meter. Sig: use qd and prn ssx high or low sugar. #100 RF x 3 100 Each 3     No current facility-administered medications for this visit.        Taking daily ASA: Yes  Taking above medications as prescribed: yes  SIDE EFFECTS: Patient denies side effects to medications    Exercise: sporadic irregular exercise, <half hour walking weekly  Diet: meals per day on average: 3, occasional snacks  Patient's body mass index is 30.45 kg/m². Exercise and nutrition counseling were performed at this visit.      Health Maintenance:   Health Maintenance Due   Topic Date Due   • IMM HEP B VACCINE (1 of 3 - Risk 3-dose series) 05/05/1978   • IMM DTaP/Tdap/Td Vaccine (1 - Tdap) 05/05/1978   • IMM PNEUMOCOCCAL 19-64 (ADULT) MEDIUM RISK SERIES (1  of 1 - PPSV23) 05/05/1978   • IMM ZOSTER VACCINES (1 of 2) 05/05/2009   • RETINAL SCREENING  05/10/2018   • IMM INFLUENZA (1) 09/01/2018       Immunizations:   PPSV23: unknown  [id number]: unknown  Tdap: unknown  Flu: unknown  Hep B: unknown    DM:   Last A1c:   Lab Results   Component Value Date/Time    HBA1C 6.2 (H) 08/27/2018 08:01 AM    HBA1C 6.9 11/14/2017 02:36 PM      A1C GOAL: < 7    Glucose monitoring frequency: none    Hypoglycemic episodes: no    Last Retinal Exam: Due  Daily Foot Exam: Yes no problems  Routine Dental Exams: Yes    Lab Results   Component Value Date/Time    MICROALBCALC 3.0 01/25/2017 12:00 AM    MICROALBCALC 9.5 03/16/2013    MICRALB 9.7 05/04/2018 08:59 AM        ACR Albumin/Creatinine Ratio goal <30     HTN:   Blood pressure goal <140/<80 yes.   Currently Rx ACE/ARB: No    Dyslipidemia:    Lab Results   Component Value Date/Time    CHOLSTRLTOT 155 05/04/2018 08:59 AM    CHOLSTRLTOT 139 03/16/2013    LDL 95 05/04/2018 08:59 AM    LDL 73 03/16/2013    HDL 46 05/04/2018 08:59 AM    HDL 45 03/16/2013    TRIGLYCERIDE 70 05/04/2018 08:59 AM    TRIGLYCERIDE 106 03/16/2013       Lab Results   Component Value Date/Time    SODIUM 139 08/27/2018 08:01 AM    SODIUM 137 06/09/2015 12:00 PM    POTASSIUM 4.9 08/27/2018 08:01 AM    POTASSIUM 3.9 06/09/2015 12:00 PM    CHLORIDE 101 08/27/2018 08:01 AM    CHLORIDE 104 06/09/2015 12:00 PM    CO2 26 08/27/2018 08:01 AM    CO2 24 06/09/2015 12:00 PM    GLUCOSE 112 (H) 08/27/2018 08:01 AM    GLUCOSE 266 (H) 06/09/2015 12:00 PM    BUN 13 08/27/2018 08:01 AM    BUN 14 06/09/2015 12:00 PM    CREATININE 0.83 08/27/2018 08:01 AM    CREATININE 0.56 06/09/2015 12:00 PM    CREATININE 0.74 03/16/2013    BUNCREATRAT 16 08/27/2018 08:01 AM     Lab Results   Component Value Date/Time    ALKPHOSPHAT 86 05/04/2018 08:59 AM    ALKPHOSPHAT 104 (H) 06/09/2015 12:00 PM    ASTSGOT 17 05/04/2018 08:59 AM    ASTSGOT 26 06/09/2015 12:00 PM    ALTSGPT 22 05/04/2018 08:59 AM     ALTSGPT 42 06/09/2015 12:00 PM    TBILIRUBIN 0.6 05/04/2018 08:59 AM    TBILIRUBIN 0.9 06/09/2015 12:00 PM        Currently Rx Statin: No    He  reports that he has never smoked. He has never used smokeless tobacco.    Objective:     Exam:  Monofilament: not done    Plan:     Discussed and educated on:   - All medications, side effects and compliance (discussed carefully)  - Annual eye examinations at Ophthalmology  - Diabetic Meal Plan: plate method  - HbA1C: target and what A1C is  - Home glucose monitoring emphasized  - Reminded pt to bring in BS diary at next visit  - Testing Blood Glucose: when to test, recording blood sugars and target range for FSBS  - Weight control and daily exercise    Recommended medication changes: stop glipizide, start Jardiance 10 mg, consider starting ACE for elevated UACR, consider starting Vitamin D3, 2000 iu daily

## 2018-08-28 NOTE — PROGRESS NOTES
Chief Complaint   Patient presents with   • Diabetes       Subjective:     History of Present Illness: Patient is a 58 y.o. male. This pleasant patien of Joy Fink who will be establishing care with Dr. Andre in the future.  He is here today for diabetes follow-up visit.    Type 2 diabetes mellitus with diabetic neuropathy (HCC)  New to me, chronic controlled problem.  This patient has been diabetic since 2012, he is currently on Trulicity 1.5, metformin 500 extended release once daily as well as glipizide 5 mg daily.  His A1c change from 6.0-6.2 but he has gained weight.  Has not changed his diet.  Compliant and exercises on a regular basis.  Would like to be switched off glipizide to guardians in order to lose weight.  - Retinal exam: ordered today  - Proteinuria testing: Normal 5/2018. Not currently on ACEi.  - ASCVD risk: On atorvastatin 10 mg daily.  - Neuropathy: Mono-filament 5/2018, on gabapentin.    Hyperlipemia  This is a chronic controlled problem.   We discussed the results of his most recent lipid panel as below.  He has been taking atorvastatin 10 mg daily but has stopped for the last couple of months because he thought he needs to be off this prescription.  He denies any side effects from statin therapy.  He is also diabetic which increases his ASCVD risk.  Lab Results   Component Value Date/Time    CHOLSTRLTOT 155 05/04/2018 08:59 AM    CHOLSTRLTOT 194 01/25/2017 12:00 AM    CHOLSTRLTOT 139 03/16/2013    LDL 95 05/04/2018 08:59 AM     (H) 01/25/2017 12:00 AM    LDL 73 03/16/2013    HDL 46 05/04/2018 08:59 AM    HDL 42 01/25/2017 12:00 AM    HDL 45 03/16/2013    TRIGLYCERIDE 70 05/04/2018 08:59 AM    TRIGLYCERIDE 122 01/25/2017 12:00 AM    TRIGLYCERIDE 106 03/16/2013             Allergies: Patient has no known allergies.    Current Outpatient Prescriptions Ordered in [facility]   Medication Sig Dispense Refill   • Empagliflozin (JARDIANCE) 10 MG Tab Take 10 mg by mouth every morning before  breakfast. 30 Tab 11   • atorvastatin (LIPITOR) 10 MG Tab Take 1 Tab by mouth every evening. 90 Tab 3   • metFORMIN ER (GLUCOPHAGE XR) 500 MG TABLET SR 24 HR TAKE 2 TABLETS BY MOUTH TWICE A  Tab 0   • glipiZIDE (GLUCOTROL) 5 MG Tab TAKE 1 TAB BY MOUTH 2 TIMES A DAY. TAKE 1 TAB BY MOUTH 2 TIMES A DAY. 180 Tab 2   • Dulaglutide (TRULICITY) 1.5 MG/0.5ML Solution Pen-injector Inject 1.5 mg as instructed every 7 days. 12 PEN 3   • gabapentin (NEURONTIN) 300 MG Cap TAKE 2 CAPSULES BY MOUTH AT BEDTIME. 180 Cap 1   • aspirin EC (ECOTRIN) 81 MG TBEC Take 162 mg by mouth every day.     • Multiple Vitamins-Minerals (MULTIVITAMIN PO) Take  by mouth every day.     • sildenafil citrate (VIAGRA) 50 MG tablet Take 1 Tab by mouth as needed for Erectile Dysfunction. 10 Tab 3   • Blood Glucose Monitoring Suppl SUPPLIES Misc Test strips order: Test strips for Accucheck Daija meter. Sig: use qd and prn ssx high or low sugar #100 RF x 3 100 Strip 3   • Lancets Misc Lancets order: Lancets for Accucheck Daija meter. Sig: use qd and prn ssx high or low sugar. #100 RF x 3 100 Each 3     No current Epic-ordered facility-administered medications on file.        Past Medical History:   Diagnosis Date   • Diabetes    • Hyperlipidemia    • Neuropathy (HCC)     associated with diabetes   • Vitamin D insufficiency        Past Surgical History:   Procedure Laterality Date   • EYE SURGERY         Social History   Substance Use Topics   • Smoking status: Never Smoker   • Smokeless tobacco: Never Used   • Alcohol use 3.0 oz/week     6 Cans of beer per week      Comment: moderate       Family History   Problem Relation Age of Onset   • Diabetes Father        ROS:     - Constitutional: Negative for fever, chills, unexpected weight change, and fatigue/generalized weakness.     - HEENT: Negative for headaches, vision changes, hearing changes, ear pain, ear discharge, sinus congestion, sore throat, and neck pain.      - Respiratory: Negative for  "cough, sputum production, dyspnea and wheezing.    - Cardiovascular: Negative for chest pain, palpitations, orthopnea, and bilateral lower extremity edema.     - Gastrointestinal: Negative for heartburn, nausea, vomiting, abdominal pain, hematochezia, melena, diarrhea, constipation, and greasy/foul-smelling stools.     - Genitourinary: Negative for dysuria, polyuria, hematuria, pyuria, urinary urgency, and urinary incontinence.    - Musculoskeletal: Negative for myalgias, back pain, and joint pain.     - Skin: Negative for rash, itching, cyanotic skin color change.     - Neurological: Negative for dizziness, tingling, tremors, focal sensory deficit, focal weakness and headaches.     - Endo/Heme/Allergies: Does not bruise/bleed easily.     - Psychiatric/Behavioral: Negative for depression, suicidal/homicidal ideation and memory loss.        - NOTE: All other systems reviewed and are negative, except as in HPI.       Physical Exam:     Blood pressure 126/68, pulse 71, temperature 36.9 °C (98.4 °F), resp. rate 16, height 1.778 m (5' 10\"), weight 96.3 kg (212 lb 3.2 oz), SpO2 95 %. Body mass index is 30.45 kg/m².   General: Normal appearing. No distress.    Data:     LABS:8/27/2018: Results reviewed and discussed with the patient, questions answered.      Assessment and Plan:     1. Type 2 diabetes mellitus with diabetic neuropathy, without long-term current use of insulin (HCC)  New to me, chronic controlled.  Please also refer to [facility] attached diabetes nurse for further details.  We will switch him from glipizide to Jardians 10 mg daily in order to facilitate weight loss.  He will continue to check his blood sugar at home and send them to [facility].  Initially, will stop glipizide and monitor blood sugar at home before adding guardians to avoid hypoglycemia.  We will do a retinal eye exam in the office today.  Otherwise diabetes surveillance is up-to-date per HPI.    - Empagliflozin (JARDIANCE) 10 MG Tab; Take 10 mg " by mouth every morning before breakfast.  Dispense: 30 Tab; Refill: 11  - POCT Retinal Eye Exam  - atorvastatin (LIPITOR) 10 MG Tab; Take 1 Tab by mouth every evening.  Dispense: 90 Tab; Refill: 3    2. Hyperlipidemia, unspecified hyperlipidemia type  This is a chronic controlled problem.   Restart Lipitor 10 mg daily.  Well-tolerated.  May consider upping to a high intensity statin in the future because of diabetes.  - atorvastatin (LIPITOR) 10 MG Tab; Take 1 Tab by mouth every evening.  Dispense: 90 Tab; Refill: 3    3. Need for vaccination  Given today in the office.  - PNEUMOCOCCAL CONJUGATE VACCINE 13-VALENT    Patient was seen by diabetes RN and me.  Spent 45 min with patient face to face. > 50% of time spent in counseling , coordinating care, reviewing records , and educating patient about diabetes.        Follow Up:      Return in about 3 months (around 11/28/2018) for DM RN visit/establish with Jes.    Please note that this dictation was created using voice recognition software. I have made every reasonable attempt to correct obvious errors, but I expect that there are errors of grammar and possibly content that I did not discover before finalizing the note.    Signed by: Jamee Lopez M.D.

## 2018-08-28 NOTE — TELEPHONE ENCOUNTER
FINAL PRIOR AUTHORIZATION STATUS:    1.  Name of Medication & Dose: Jardiance 10mg tabs     2. Prior Auth Status: Denied.

## 2018-10-09 DIAGNOSIS — E11.40 TYPE 2 DIABETES MELLITUS WITH DIABETIC NEUROPATHY (HCC): ICD-10-CM

## 2018-10-10 RX ORDER — METFORMIN HYDROCHLORIDE 500 MG/1
TABLET, EXTENDED RELEASE ORAL
Qty: 360 TAB | Refills: 0 | Status: SHIPPED | OUTPATIENT
Start: 2018-10-10 | End: 2019-01-03 | Stop reason: SDUPTHER

## 2018-11-08 DIAGNOSIS — E11.40 TYPE 2 DIABETES MELLITUS WITH DIABETIC NEUROPATHY, WITHOUT LONG-TERM CURRENT USE OF INSULIN (HCC): ICD-10-CM

## 2018-11-08 RX ORDER — GABAPENTIN 300 MG/1
600 CAPSULE ORAL 3 TIMES DAILY
Qty: 180 CAP | Refills: 3 | Status: SHIPPED | OUTPATIENT
Start: 2018-11-08 | End: 2019-03-13 | Stop reason: SDUPTHER

## 2018-12-04 ENCOUNTER — OFFICE VISIT (OUTPATIENT)
Dept: MEDICAL GROUP | Facility: LAB | Age: 58
End: 2018-12-04
Payer: COMMERCIAL

## 2018-12-04 VITALS
BODY MASS INDEX: 31.21 KG/M2 | DIASTOLIC BLOOD PRESSURE: 70 MMHG | TEMPERATURE: 98.1 F | SYSTOLIC BLOOD PRESSURE: 116 MMHG | OXYGEN SATURATION: 98 % | HEART RATE: 76 BPM | RESPIRATION RATE: 12 BRPM | HEIGHT: 70 IN | WEIGHT: 218 LBS

## 2018-12-04 DIAGNOSIS — Z12.5 SCREENING FOR PROSTATE CANCER: ICD-10-CM

## 2018-12-04 DIAGNOSIS — Z91.89 OTHER SPECIFIED PERSONAL RISK FACTORS, NOT ELSEWHERE CLASSIFIED: ICD-10-CM

## 2018-12-04 DIAGNOSIS — E11.40 TYPE 2 DIABETES MELLITUS WITH DIABETIC NEUROPATHY, WITHOUT LONG-TERM CURRENT USE OF INSULIN (HCC): ICD-10-CM

## 2018-12-04 DIAGNOSIS — E78.5 HYPERLIPIDEMIA, UNSPECIFIED HYPERLIPIDEMIA TYPE: ICD-10-CM

## 2018-12-04 DIAGNOSIS — Z76.89 ESTABLISHING CARE WITH NEW DOCTOR, ENCOUNTER FOR: ICD-10-CM

## 2018-12-04 PROCEDURE — 99214 OFFICE O/P EST MOD 30 MIN: CPT | Performed by: INTERNAL MEDICINE

## 2018-12-04 ASSESSMENT — PATIENT HEALTH QUESTIONNAIRE - PHQ9: CLINICAL INTERPRETATION OF PHQ2 SCORE: 0

## 2018-12-04 NOTE — PROGRESS NOTES
CC: Luiz Melvin is a 58 y.o. male is suffering from   Chief Complaint   Patient presents with   • Establish Care         SUBJECTIVE:  1. Establishing care with new doctor, encounter for  Patient is here for establishment of care previously followed by [facility].  Patient appears to be doing generally quite well.  Usually is seen every 6 months we will do 3 months for the first visit.     2. Type 2 diabetes mellitus with diabetic neuropathy, without long-term current use of insulin (HCC)  Patient with a history of type 2 diabetes clinically stable no change in medical therapy.    3. Hyperlipidemia, unspecified hyperlipidemia type  History of dyslipidemia of recommended that he undergo CT cardiac scoring    4. BMI 30.0-30.9,adult  Encourage weight loss    5. Other specified personal risk factors, not elsewhere classified  CT cardiac scoring as detailed above    6. Screening for prostate cancer  Orders written        Past social, family, history:   Social History   Substance Use Topics   • Smoking status: Never Smoker   • Smokeless tobacco: Never Used   • Alcohol use 3.0 oz/week     6 Cans of beer per week      Comment: moderate         MEDICATIONS:    Current Outpatient Prescriptions:   •  gabapentin (NEURONTIN) 300 MG Cap, Take 2 Caps by mouth 3 times a day., Disp: 180 Cap, Rfl: 3  •  metFORMIN ER (GLUCOPHAGE XR) 500 MG TABLET SR 24 HR, TAKE 2 TABLETS BY MOUTH TWICE A DAY, Disp: 360 Tab, Rfl: 0  •  atorvastatin (LIPITOR) 10 MG Tab, Take 1 Tab by mouth every evening., Disp: 90 Tab, Rfl: 3  •  glipiZIDE (GLUCOTROL) 5 MG Tab, TAKE 1 TAB BY MOUTH 2 TIMES A DAY. TAKE 1 TAB BY MOUTH 2 TIMES A DAY., Disp: 180 Tab, Rfl: 2  •  Dulaglutide (TRULICITY) 1.5 MG/0.5ML Solution Pen-injector, Inject 1.5 mg as instructed every 7 days., Disp: 12 PEN, Rfl: 3  •  aspirin EC (ECOTRIN) 81 MG TBEC, Take 162 mg by mouth every day., Disp: , Rfl:   •  Multiple Vitamins-Minerals (MULTIVITAMIN PO), Take  by mouth every day.,  Disp: , Rfl:   •  Empagliflozin (JARDIANCE) 10 MG Tab, Take 10 mg by mouth every morning before breakfast. (Patient not taking: Reported on 12/4/2018), Disp: 30 Tab, Rfl: 11  •  sildenafil citrate (VIAGRA) 50 MG tablet, Take 1 Tab by mouth as needed for Erectile Dysfunction., Disp: 10 Tab, Rfl: 3  •  Blood Glucose Monitoring Suppl SUPPLIES Misc, Test strips order: Test strips for Accucheck Daija meter. Sig: use qd and prn ssx high or low sugar #100 RF x 3, Disp: 100 Strip, Rfl: 3  •  Lancets Misc, Lancets order: Lancets for Accucheck Daija meter. Sig: use qd and prn ssx high or low sugar. #100 RF x 3, Disp: 100 Each, Rfl: 3    PROBLEMS:  Patient Active Problem List    Diagnosis Date Noted   • ED (erectile dysfunction) 11/14/2017   • Onychomycosis 05/10/2017   • BMI 30.0-30.9,adult 07/19/2016   • Hyperlipemia 10/27/2015   • Abnormal EKG 06/24/2015   • Cataracts, bilateral 10/13/2014   • Type 2 diabetes mellitus with diabetic neuropathy (HCC) 10/13/2014       REVIEW OF SYSTEMS:  General: Patient denies any problems with nausea vomiting fever chills chest pain or tightness.  Head:  No history of strokes seizures severe head trauma or loss of consciousness.   HEENT: No history of any significant vision loss, hearing loss, changes in sense of smell hoarseness  Heart: No chest pain tightness squeezing.   Lungs: No recurrent asthma bronchitis pneumonia.   Gastrointestinal: No history of black or bloody stools or  Constipation colonoscopy was done.  Genitourinary:  No increased frequency urgency pain and burning with urination  Musculoskeletal: No joint pain or discomfort.   Skin: No skin changes      PHYSICAL EXAM   Constitutional: Alert, cooperative, not in acute distress.  Cardiovascular:  Rate Rhythm is regular without murmurs rubs clicks.     Thorax & Lungs: Clear to auscultation, no wheezing, rhonchi, or rales  HENT: Normocephalic, Atraumatic.  Eyes: PERRLA, EOMI, Conjunctiva normal.   Neck: Trachia is midline no  "swelling of the thyroid.   Lymphatic: No lymphadenopathy noted.   Neurologic: Alert & oriented x 3, cranial nerves II through XII are intact, Normal motor function, Normal sensory function, No focal deficits noted.   Psychiatric: Affect normal, Judgment normal, Mood normal.     VITAL SIGNS:/70   Pulse 76   Temp 36.7 °C (98.1 °F) (Temporal)   Resp 12   Ht 1.778 m (5' 10\")   Wt 98.9 kg (218 lb)   SpO2 98%   BMI 31.28 kg/m²     Labs: Reviewed    Assessment:                                                     Plan:    1. Establishing care with new doctor, encounter for  Medically stable    2. Type 2 diabetes mellitus with diabetic neuropathy, without long-term current use of insulin ([facility])  CT cardiac scoring, labs ordered for 3 months  - CT-CARDIAC SCORING; Future  - COMP METABOLIC PANEL; Future  - CBC WITH DIFFERENTIAL; Future  - Lipid Profile; Future  - HEMOGLOBIN A1C; Future    3. Hyperlipidemia, unspecified hyperlipidemia type  Recheck cholesterol  - CT-CARDIAC SCORING; Future  - Lipid Profile; Future    4. BMI 30.0-30.9,adult  Discussed weight loss with the patient  - CT-CARDIAC SCORING; Future    5. Other specified personal risk factors, not elsewhere classified  Orders written  - CT-CARDIAC SCORING; Future    6. Screening for prostate cancer  Recheck PSA  - PROSTATE SPECIFIC AG SCREENING; Future        "

## 2019-01-03 DIAGNOSIS — E11.40 TYPE 2 DIABETES MELLITUS WITH DIABETIC NEUROPATHY, UNSPECIFIED WHETHER LONG TERM INSULIN USE (HCC): ICD-10-CM

## 2019-01-03 RX ORDER — METFORMIN HYDROCHLORIDE 500 MG/1
1000 TABLET, EXTENDED RELEASE ORAL 2 TIMES DAILY
Qty: 360 TAB | Refills: 0 | Status: SHIPPED | OUTPATIENT
Start: 2019-01-03 | End: 2019-04-12 | Stop reason: SDUPTHER

## 2019-03-13 DIAGNOSIS — E11.40 TYPE 2 DIABETES MELLITUS WITH DIABETIC NEUROPATHY, WITHOUT LONG-TERM CURRENT USE OF INSULIN (HCC): ICD-10-CM

## 2019-03-13 RX ORDER — GABAPENTIN 300 MG/1
600 CAPSULE ORAL 3 TIMES DAILY
Qty: 540 CAP | Refills: 3 | Status: SHIPPED | OUTPATIENT
Start: 2019-03-13 | End: 2020-04-20

## 2019-03-14 NOTE — TELEPHONE ENCOUNTER
Was the patient seen in the last year in this department? Yes  LOV 12/4/18  Does patient have an active prescription for medications requested? No     Received Request Via: Pharmacy

## 2019-03-20 ENCOUNTER — OFFICE VISIT (OUTPATIENT)
Dept: MEDICAL GROUP | Facility: LAB | Age: 59
End: 2019-03-20
Payer: COMMERCIAL

## 2019-03-20 VITALS
HEIGHT: 70 IN | DIASTOLIC BLOOD PRESSURE: 60 MMHG | HEART RATE: 84 BPM | OXYGEN SATURATION: 96 % | WEIGHT: 216 LBS | RESPIRATION RATE: 12 BRPM | TEMPERATURE: 98.1 F | BODY MASS INDEX: 30.92 KG/M2 | SYSTOLIC BLOOD PRESSURE: 120 MMHG

## 2019-03-20 DIAGNOSIS — E78.5 HYPERLIPIDEMIA, UNSPECIFIED HYPERLIPIDEMIA TYPE: ICD-10-CM

## 2019-03-20 DIAGNOSIS — E11.40 TYPE 2 DIABETES MELLITUS WITH DIABETIC NEUROPATHY, WITHOUT LONG-TERM CURRENT USE OF INSULIN (HCC): ICD-10-CM

## 2019-03-20 LAB
ALBUMIN SERPL-MCNC: 4.4 G/DL (ref 3.5–5.5)
ALBUMIN/GLOB SERPL: 1.7 {RATIO} (ref 1.2–2.2)
ALP SERPL-CCNC: 92 IU/L (ref 39–117)
ALT SERPL-CCNC: 15 IU/L (ref 0–44)
AST SERPL-CCNC: 15 IU/L (ref 0–40)
BASOPHILS # BLD AUTO: 0.1 X10E3/UL (ref 0–0.2)
BASOPHILS NFR BLD AUTO: 1 %
BILIRUB SERPL-MCNC: 0.7 MG/DL (ref 0–1.2)
BUN SERPL-MCNC: 17 MG/DL (ref 6–24)
BUN/CREAT SERPL: 22 (ref 9–20)
CALCIUM SERPL-MCNC: 9.6 MG/DL (ref 8.7–10.2)
CHLORIDE SERPL-SCNC: 102 MMOL/L (ref 96–106)
CHOLEST SERPL-MCNC: 122 MG/DL (ref 100–199)
CO2 SERPL-SCNC: 25 MMOL/L (ref 20–29)
CREAT SERPL-MCNC: 0.78 MG/DL (ref 0.76–1.27)
EOSINOPHIL # BLD AUTO: 0.6 X10E3/UL (ref 0–0.4)
EOSINOPHIL NFR BLD AUTO: 9 %
ERYTHROCYTE [DISTWIDTH] IN BLOOD BY AUTOMATED COUNT: 12.4 % (ref 12.3–15.4)
GLOBULIN SER CALC-MCNC: 2.6 G/DL (ref 1.5–4.5)
GLUCOSE SERPL-MCNC: 102 MG/DL (ref 65–99)
HBA1C MFR BLD: 6.3 % (ref 4.8–5.6)
HCT VFR BLD AUTO: 43 % (ref 37.5–51)
HDLC SERPL-MCNC: 41 MG/DL
HGB BLD-MCNC: 14.6 G/DL (ref 13–17.7)
IMM GRANULOCYTES # BLD AUTO: 0 X10E3/UL (ref 0–0.1)
IMM GRANULOCYTES NFR BLD AUTO: 0 %
IMMATURE CELLS  115398: ABNORMAL
LABORATORY COMMENT REPORT: NORMAL
LDLC SERPL CALC-MCNC: 67 MG/DL (ref 0–99)
LYMPHOCYTES # BLD AUTO: 1.6 X10E3/UL (ref 0.7–3.1)
LYMPHOCYTES NFR BLD AUTO: 26 %
MCH RBC QN AUTO: 29.5 PG (ref 26.6–33)
MCHC RBC AUTO-ENTMCNC: 34 G/DL (ref 31.5–35.7)
MCV RBC AUTO: 87 FL (ref 79–97)
MONOCYTES # BLD AUTO: 0.6 X10E3/UL (ref 0.1–0.9)
MONOCYTES NFR BLD AUTO: 10 %
MORPHOLOGY BLD-IMP: ABNORMAL
NEUTROPHILS # BLD AUTO: 3.5 X10E3/UL (ref 1.4–7)
NEUTROPHILS NFR BLD AUTO: 54 %
NRBC BLD AUTO-RTO: ABNORMAL %
PLATELET # BLD AUTO: 198 X10E3/UL (ref 150–379)
POTASSIUM SERPL-SCNC: 5.2 MMOL/L (ref 3.5–5.2)
PROT SERPL-MCNC: 7 G/DL (ref 6–8.5)
PSA SERPL-MCNC: 1.6 NG/ML (ref 0–4)
RBC # BLD AUTO: 4.95 X10E6/UL (ref 4.14–5.8)
SODIUM SERPL-SCNC: 141 MMOL/L (ref 134–144)
TRIGL SERPL-MCNC: 71 MG/DL (ref 0–149)
VLDLC SERPL CALC-MCNC: 14 MG/DL (ref 5–40)
WBC # BLD AUTO: 6.4 X10E3/UL (ref 3.4–10.8)

## 2019-03-20 PROCEDURE — 99214 OFFICE O/P EST MOD 30 MIN: CPT | Performed by: INTERNAL MEDICINE

## 2019-03-20 ASSESSMENT — PATIENT HEALTH QUESTIONNAIRE - PHQ9: CLINICAL INTERPRETATION OF PHQ2 SCORE: 0

## 2019-03-21 NOTE — PROGRESS NOTES
CC: Luiz Melvin is a 58 y.o. male is suffering from   Chief Complaint   Patient presents with   • Follow-Up     3 months         SUBJECTIVE:  1. Type 2 diabetes mellitus with diabetic neuropathy, without long-term current use of insulin (HCC)  Luiz is here for follow-up, continues to have problems with diabetic neuropathy.  Is doing well last glycosylated hemoglobin is 6.3    2. BMI 30.0-30.9,adult  Discussed with the patient working on losing weight.    3. Hyperlipidemia, unspecified hyperlipidemia type  History of hyperlipidemia, patient is to work on diet exercise        Past social, family, history:   Social History   Substance Use Topics   • Smoking status: Never Smoker   • Smokeless tobacco: Never Used   • Alcohol use 3.0 oz/week     6 Cans of beer per week      Comment: moderate         MEDICATIONS:    Current Outpatient Prescriptions:   •  gabapentin (NEURONTIN) 300 MG Cap, Take 2 Caps by mouth 3 times a day., Disp: 540 Cap, Rfl: 3  •  metFORMIN ER (GLUCOPHAGE XR) 500 MG TABLET SR 24 HR, Take 2 Tabs by mouth 2 times a day., Disp: 360 Tab, Rfl: 0  •  Empagliflozin (JARDIANCE) 10 MG Tab, Take 10 mg by mouth every morning before breakfast., Disp: 30 Tab, Rfl: 11  •  atorvastatin (LIPITOR) 10 MG Tab, Take 1 Tab by mouth every evening., Disp: 90 Tab, Rfl: 3  •  glipiZIDE (GLUCOTROL) 5 MG Tab, TAKE 1 TAB BY MOUTH 2 TIMES A DAY. TAKE 1 TAB BY MOUTH 2 TIMES A DAY., Disp: 180 Tab, Rfl: 2  •  Dulaglutide (TRULICITY) 1.5 MG/0.5ML Solution Pen-injector, Inject 1.5 mg as instructed every 7 days., Disp: 12 PEN, Rfl: 3  •  aspirin EC (ECOTRIN) 81 MG TBEC, Take 162 mg by mouth every day., Disp: , Rfl:   •  Multiple Vitamins-Minerals (MULTIVITAMIN PO), Take  by mouth every day., Disp: , Rfl:   •  sildenafil citrate (VIAGRA) 50 MG tablet, Take 1 Tab by mouth as needed for Erectile Dysfunction., Disp: 10 Tab, Rfl: 3  •  Blood Glucose Monitoring Suppl SUPPLIES Misc, Test strips order: Test strips for Daojia  "meter. Sig: use qd and prn ssx high or low sugar #100 RF x 3, Disp: 100 Strip, Rfl: 3  •  Lancets Misc, Lancets order: Lancets for Accucheck Daija meter. Sig: use qd and prn ssx high or low sugar. #100 RF x 3, Disp: 100 Each, Rfl: 3    PROBLEMS:  Patient Active Problem List    Diagnosis Date Noted   • ED (erectile dysfunction) 11/14/2017   • Onychomycosis 05/10/2017   • BMI 30.0-30.9,adult 07/19/2016   • Hyperlipemia 10/27/2015   • Abnormal EKG 06/24/2015   • Cataracts, bilateral 10/13/2014   • Type 2 diabetes mellitus with diabetic neuropathy (HCC) 10/13/2014       REVIEW OF SYSTEMS:  Gen.:  No Nausea, Vomiting, fever, Chills.  Heart: No chest pain.  Lungs:  No shortness of Breath.  Psychological: Angel unusual Anxiety depression     PHYSICAL EXAM   Constitutional: Alert, cooperative, not in acute distress.  Cardiovascular:  Rate Rhythm is regular without murmurs rubs clicks.     Thorax & Lungs: Clear to auscultation, no wheezing, rhonchi, or rales  HENT: Normocephalic, Atraumatic.  Eyes: PERRLA, EOMI, Conjunctiva normal.   Neck: Trachia is midline no swelling of the thyroid.   Lymphatic: No lymphadenopathy noted.   Neurologic: Alert & oriented x 3, cranial nerves II through XII are intact, Normal motor function, Normal sensory function, No focal deficits noted.   Psychiatric: Affect normal, Judgment normal, Mood normal.     VITAL SIGNS:/60   Pulse 84   Temp 36.7 °C (98.1 °F) (Temporal)   Resp 12   Ht 1.778 m (5' 10\")   Wt 98 kg (216 lb)   SpO2 96%   BMI 30.99 kg/m²     Labs: Reviewed    Assessment:                                                     Plan:    1. Type 2 diabetes mellitus with diabetic neuropathy, without long-term current use of insulin (HCC)  Recheck hemoglobin A1c 6 months  - HEMOGLOBIN A1C; Future    2. BMI 30.0-30.9,adult  Patient is to continue to work on his diet exercise    3. Hyperlipidemia, unspecified hyperlipidemia type  Recheck lipid profile  - Lipid Profile; Future        "

## 2019-04-12 DIAGNOSIS — E11.40 TYPE 2 DIABETES MELLITUS WITH DIABETIC NEUROPATHY, UNSPECIFIED WHETHER LONG TERM INSULIN USE (HCC): ICD-10-CM

## 2019-04-12 RX ORDER — METFORMIN HYDROCHLORIDE 500 MG/1
TABLET, EXTENDED RELEASE ORAL
Qty: 360 TAB | Refills: 1 | Status: SHIPPED | OUTPATIENT
Start: 2019-04-12 | End: 2019-07-29 | Stop reason: SDUPTHER

## 2019-04-12 NOTE — TELEPHONE ENCOUNTER
Was the patient seen in the last year in this department? Yes lov 3/20/19    Does patient have an active prescription for medications requested? No     Received Request Via: Pharmacy

## 2019-04-15 DIAGNOSIS — E11.40 TYPE 2 DIABETES MELLITUS WITH DIABETIC NEUROPATHY, WITHOUT LONG-TERM CURRENT USE OF INSULIN (HCC): ICD-10-CM

## 2019-04-15 NOTE — TELEPHONE ENCOUNTER
Was the patient seen in the last year in this department? Yes  3/20/19  Does patient have an active prescription for medications requested? No     Received Request Via: Pharmacy

## 2019-05-20 ENCOUNTER — APPOINTMENT (OUTPATIENT)
Dept: RADIOLOGY | Facility: MEDICAL CENTER | Age: 59
End: 2019-05-20
Attending: INTERNAL MEDICINE
Payer: COMMERCIAL

## 2019-06-10 ENCOUNTER — HOSPITAL ENCOUNTER (OUTPATIENT)
Dept: RADIOLOGY | Facility: MEDICAL CENTER | Age: 59
End: 2019-06-10
Attending: INTERNAL MEDICINE

## 2019-06-10 DIAGNOSIS — E11.40 TYPE 2 DIABETES MELLITUS WITH DIABETIC NEUROPATHY, WITHOUT LONG-TERM CURRENT USE OF INSULIN (HCC): ICD-10-CM

## 2019-06-10 DIAGNOSIS — Z91.89 OTHER SPECIFIED PERSONAL RISK FACTORS, NOT ELSEWHERE CLASSIFIED: ICD-10-CM

## 2019-06-10 DIAGNOSIS — E78.5 HYPERLIPIDEMIA, UNSPECIFIED HYPERLIPIDEMIA TYPE: ICD-10-CM

## 2019-06-10 PROCEDURE — 4410556 CT-CARDIAC SCORING

## 2019-07-29 DIAGNOSIS — E11.40 TYPE 2 DIABETES MELLITUS WITH DIABETIC NEUROPATHY, UNSPECIFIED WHETHER LONG TERM INSULIN USE (HCC): ICD-10-CM

## 2019-07-29 DIAGNOSIS — E11.40 TYPE 2 DIABETES MELLITUS WITH DIABETIC NEUROPATHY, WITHOUT LONG-TERM CURRENT USE OF INSULIN (HCC): ICD-10-CM

## 2019-07-29 RX ORDER — GLIPIZIDE 5 MG/1
5 TABLET ORAL 2 TIMES DAILY
Qty: 180 TAB | Refills: 2 | Status: SHIPPED | OUTPATIENT
Start: 2019-07-29 | End: 2020-04-20

## 2019-07-29 RX ORDER — METFORMIN HYDROCHLORIDE 500 MG/1
1000 TABLET, EXTENDED RELEASE ORAL 2 TIMES DAILY
Qty: 360 TAB | Refills: 1 | Status: SHIPPED | OUTPATIENT
Start: 2019-07-29 | End: 2020-01-21

## 2019-07-29 NOTE — TELEPHONE ENCOUNTER
Was the patient seen in the last year in this department? Yes3/20/2019    Does patient have an active prescription for medications requested? No     Received Request Via: Pharmacy

## 2019-07-29 NOTE — TELEPHONE ENCOUNTER
----- Message from Luiz Melvin sent at 7/28/2019 10:48 AM PDT -----  Regarding: Prescription Question  Contact: [phone]  I am out of Metformin for my diabetis. Not scheduled to see Dr. Andre unil 9/24/2019 and [facility] indicates i must contact  to get Metformin refilled. I am requesting refill. Thank you.

## 2019-07-29 NOTE — TELEPHONE ENCOUNTER
Was the patient seen in the last year in this department? Yes 3/20/19    Does patient have an active prescription for medications requested? No     Received Request Via: Patient

## 2019-09-26 DIAGNOSIS — E78.5 HYPERLIPIDEMIA, UNSPECIFIED HYPERLIPIDEMIA TYPE: ICD-10-CM

## 2019-09-26 DIAGNOSIS — E11.40 TYPE 2 DIABETES MELLITUS WITH DIABETIC NEUROPATHY, WITHOUT LONG-TERM CURRENT USE OF INSULIN (HCC): ICD-10-CM

## 2019-09-26 RX ORDER — ATORVASTATIN CALCIUM 10 MG/1
10 TABLET, FILM COATED ORAL EVERY EVENING
Qty: 90 TAB | Refills: 3 | Status: SHIPPED | OUTPATIENT
Start: 2019-09-26

## 2019-09-26 NOTE — TELEPHONE ENCOUNTER
Was the patient seen in the last year in this department? Yes3/20/19    Does patient have an active prescription for medications requested? No     Received Request Via: Pharmacy

## 2019-10-22 LAB
CHOLEST SERPL-MCNC: 109 MG/DL (ref 100–199)
HBA1C MFR BLD: 6 % (ref 4.8–5.6)
HDLC SERPL-MCNC: 48 MG/DL
LABORATORY COMMENT REPORT: NORMAL
LDLC SERPL CALC-MCNC: 47 MG/DL (ref 0–99)
TRIGL SERPL-MCNC: 69 MG/DL (ref 0–149)
VLDLC SERPL CALC-MCNC: 14 MG/DL (ref 5–40)

## 2019-10-23 ENCOUNTER — OFFICE VISIT (OUTPATIENT)
Dept: MEDICAL GROUP | Facility: LAB | Age: 59
End: 2019-10-23
Payer: COMMERCIAL

## 2019-10-23 VITALS
TEMPERATURE: 98.4 F | HEIGHT: 70 IN | SYSTOLIC BLOOD PRESSURE: 125 MMHG | WEIGHT: 210.54 LBS | OXYGEN SATURATION: 97 % | RESPIRATION RATE: 14 BRPM | BODY MASS INDEX: 30.14 KG/M2 | HEART RATE: 76 BPM | DIASTOLIC BLOOD PRESSURE: 70 MMHG

## 2019-10-23 DIAGNOSIS — E11.621 DIABETIC ULCER OF LEFT MIDFOOT ASSOCIATED WITH TYPE 2 DIABETES MELLITUS, LIMITED TO BREAKDOWN OF SKIN (HCC): ICD-10-CM

## 2019-10-23 DIAGNOSIS — L97.421 DIABETIC ULCER OF LEFT MIDFOOT ASSOCIATED WITH TYPE 2 DIABETES MELLITUS, LIMITED TO BREAKDOWN OF SKIN (HCC): ICD-10-CM

## 2019-10-23 DIAGNOSIS — Z23 NEED FOR IMMUNIZATION AGAINST INFLUENZA: ICD-10-CM

## 2019-10-23 DIAGNOSIS — E11.40 TYPE 2 DIABETES MELLITUS WITH DIABETIC NEUROPATHY, WITHOUT LONG-TERM CURRENT USE OF INSULIN (HCC): ICD-10-CM

## 2019-10-23 PROCEDURE — 90686 IIV4 VACC NO PRSV 0.5 ML IM: CPT | Performed by: INTERNAL MEDICINE

## 2019-10-23 PROCEDURE — 99214 OFFICE O/P EST MOD 30 MIN: CPT | Mod: 25 | Performed by: INTERNAL MEDICINE

## 2019-10-23 PROCEDURE — 90471 IMMUNIZATION ADMIN: CPT | Performed by: INTERNAL MEDICINE

## 2019-10-24 NOTE — PROGRESS NOTES
CC: Luiz Melvin is a 59 y.o. male is suffering from   Chief Complaint   Patient presents with   • Follow-Up     diabetic check, blister on L foot          SUBJECTIVE:  1. Diabetic ulcer of left midfoot associated with type 2 diabetes mellitus, limited to breakdown of skin (HCC)  Patient is here for follow-up states that he works for [employer] apparently has been doing a lot of standing is concerned about a possible blister of his foot.  Patient states he has been cleaning it twice a day has had it over the past approximately 1 month.    2. Need for immunization against influenza  Patient would need of influenza vaccination which was given    3. Type 2 diabetes mellitus with diabetic neuropathy, without long-term current use of insulin (HCC)  Type 2 diabetes with excellent control        Past social, family, history:   Social History     Tobacco Use   • Smoking status: Never Smoker   • Smokeless tobacco: Never Used   Substance Use Topics   • Alcohol use: Yes     Alcohol/week: 3.0 oz     Types: 6 Cans of beer per week     Comment: moderate   • Drug use: No         MEDICATIONS:    Current Outpatient Medications:   •  atorvastatin (LIPITOR) 10 MG Tab, Take 1 Tab by mouth every evening., Disp: 90 Tab, Rfl: 3  •  metFORMIN ER (GLUCOPHAGE XR) 500 MG TABLET SR 24 HR, Take 2 Tabs by mouth 2 times a day., Disp: 360 Tab, Rfl: 1  •  glipiZIDE (GLUCOTROL) 5 MG Tab, Take 1 Tab by mouth 2 times a day. TAKE 1 TAB BY MOUTH 2 TIMES A DAY., Disp: 180 Tab, Rfl: 2  •  Dulaglutide (TRULICITY) 1.5 MG/0.5ML Solution Pen-injector, Inject 1.5 mg as instructed every 7 days., Disp: 12 PEN, Rfl: 3  •  gabapentin (NEURONTIN) 300 MG Cap, Take 2 Caps by mouth 3 times a day., Disp: 540 Cap, Rfl: 3  •  Empagliflozin (JARDIANCE) 10 MG Tab, Take 10 mg by mouth every morning before breakfast., Disp: 30 Tab, Rfl: 11  •  sildenafil citrate (VIAGRA) 50 MG tablet, Take 1 Tab by mouth as needed for Erectile  "Dysfunction., Disp: 10 Tab, Rfl: 3  •  Blood Glucose Monitoring Suppl SUPPLIES Misc, Test strips order: Test strips for Accucheck Daija meter. Sig: use qd and prn ssx high or low sugar #100 RF x 3, Disp: 100 Strip, Rfl: 3  •  Lancets Misc, Lancets order: Lancets for Accucheck Daija meter. Sig: use qd and prn ssx high or low sugar. #100 RF x 3, Disp: 100 Each, Rfl: 3  •  aspirin EC (ECOTRIN) 81 MG TBEC, Take 162 mg by mouth every day., Disp: , Rfl:   •  Multiple Vitamins-Minerals (MULTIVITAMIN PO), Take  by mouth every day., Disp: , Rfl:     PROBLEMS:  Patient Active Problem List    Diagnosis Date Noted   • ED (erectile dysfunction) 11/14/2017   • Onychomycosis 05/10/2017   • BMI 30.0-30.9,adult 07/19/2016   • Hyperlipemia 10/27/2015   • Abnormal EKG 06/24/2015   • Cataracts, bilateral 10/13/2014   • Type 2 diabetes mellitus with diabetic neuropathy (HCC) 10/13/2014       REVIEW OF SYSTEMS:  Gen.:  No Nausea, Vomiting, fever, Chills.  Heart: No chest pain.  Lungs:  No shortness of Breath.  Psychological: Agnel unusual Anxiety depression     PHYSICAL EXAM   Constitutional: Alert, cooperative, not in acute distress.  Cardiovascular:  Rate Rhythm is regular without murmurs rubs clicks.     Thorax & Lungs: Clear to auscultation, no wheezing, rhonchi, or rales  HENT: Normocephalic, Atraumatic.  Eyes: PERRLA, EOMI, Conjunctiva normal.   Neck: Trachia is midline no swelling of the thyroid.   Lymphatic: No lymphadenopathy noted.   Musculoskeletal: Diabetic foot ulcer approximately 1.3 cm round, 3 to 4 mm deep  Neurologic: Alert & oriented x 3, cranial nerves II through XII are intact, Normal motor function, Normal sensory function, No focal deficits noted.   Psychiatric: Affect normal, Judgment normal, Mood normal.     VITAL SIGNS:/70   Pulse 76   Temp 36.9 °C (98.4 °F)   Resp 14   Ht 1.778 m (5' 10\")   Wt 95.5 kg (210 lb 8.6 oz)   SpO2 97%   BMI 30.21 kg/m²     Labs: Reviewed    Assessment:                      "                                Plan:    1. Diabetic ulcer of left midfoot associated with type 2 diabetes mellitus, limited to breakdown of skin (HCC)  Diabetic foot ulcer treated with Polysporin, padded gauze, DuoDERM place given to the patient urgent request put in to podiatry patient's to change dressing once daily  - REFERRAL TO PODIATRY    2. Need for immunization against influenza  Influenza vaccination given  - Influenza Vaccine Quad Injection (PF)    3. Type 2 diabetes mellitus with diabetic neuropathy, without long-term current use of insulin (HCC)  Type 2 diabetes with excellent control

## 2019-10-28 ENCOUNTER — HOSPITAL ENCOUNTER (OUTPATIENT)
Dept: LAB | Facility: MEDICAL CENTER | Age: 59
End: 2019-10-28
Attending: PODIATRIST
Payer: COMMERCIAL

## 2019-10-28 LAB
GRAM STN SPEC: NORMAL
SIGNIFICANT IND 70042: NORMAL
SITE SITE: NORMAL
SOURCE SOURCE: NORMAL

## 2019-10-28 PROCEDURE — 87077 CULTURE AEROBIC IDENTIFY: CPT

## 2019-10-28 PROCEDURE — 87205 SMEAR GRAM STAIN: CPT

## 2019-10-28 PROCEDURE — 87070 CULTURE OTHR SPECIMN AEROBIC: CPT

## 2019-10-28 PROCEDURE — 87186 SC STD MICRODIL/AGAR DIL: CPT

## 2019-10-30 LAB
BACTERIA WND AEROBE CULT: ABNORMAL
BACTERIA WND AEROBE CULT: ABNORMAL
GRAM STN SPEC: ABNORMAL
SIGNIFICANT IND 70042: ABNORMAL
SITE SITE: ABNORMAL
SOURCE SOURCE: ABNORMAL

## 2019-11-13 ENCOUNTER — OFFICE VISIT (OUTPATIENT)
Dept: MEDICAL GROUP | Facility: LAB | Age: 59
End: 2019-11-13
Payer: COMMERCIAL

## 2019-11-13 VITALS
TEMPERATURE: 97.9 F | HEART RATE: 78 BPM | WEIGHT: 209.6 LBS | OXYGEN SATURATION: 98 % | RESPIRATION RATE: 12 BRPM | DIASTOLIC BLOOD PRESSURE: 54 MMHG | SYSTOLIC BLOOD PRESSURE: 120 MMHG | HEIGHT: 70 IN | BODY MASS INDEX: 30.01 KG/M2

## 2019-11-13 DIAGNOSIS — L97.525 DIABETIC ULCER OF TOE OF LEFT FOOT ASSOCIATED WITH TYPE 2 DIABETES MELLITUS, WITH MUSCLE INVOLVEMENT WITHOUT EVIDENCE OF NECROSIS (HCC): ICD-10-CM

## 2019-11-13 DIAGNOSIS — E11.40 TYPE 2 DIABETES MELLITUS WITH DIABETIC NEUROPATHY, WITHOUT LONG-TERM CURRENT USE OF INSULIN (HCC): ICD-10-CM

## 2019-11-13 DIAGNOSIS — E11.621 DIABETIC ULCER OF TOE OF LEFT FOOT ASSOCIATED WITH TYPE 2 DIABETES MELLITUS, WITH MUSCLE INVOLVEMENT WITHOUT EVIDENCE OF NECROSIS (HCC): ICD-10-CM

## 2019-11-13 PROCEDURE — 99213 OFFICE O/P EST LOW 20 MIN: CPT | Performed by: INTERNAL MEDICINE

## 2019-11-13 RX ORDER — MINOCYCLINE HYDROCHLORIDE 50 MG/1
50 CAPSULE ORAL
Refills: 0 | COMMUNITY
Start: 2019-10-29

## 2019-11-26 ENCOUNTER — OFFICE VISIT (OUTPATIENT)
Dept: MEDICAL GROUP | Facility: LAB | Age: 59
End: 2019-11-26
Payer: COMMERCIAL

## 2019-11-26 VITALS
BODY MASS INDEX: 30.86 KG/M2 | HEART RATE: 76 BPM | WEIGHT: 215.6 LBS | OXYGEN SATURATION: 97 % | SYSTOLIC BLOOD PRESSURE: 104 MMHG | TEMPERATURE: 97.9 F | DIASTOLIC BLOOD PRESSURE: 56 MMHG | RESPIRATION RATE: 12 BRPM | HEIGHT: 70 IN

## 2019-11-26 DIAGNOSIS — L97.529 ULCER OF LEFT FOOT, UNSPECIFIED ULCER STAGE (HCC): ICD-10-CM

## 2019-11-26 DIAGNOSIS — E11.40 TYPE 2 DIABETES MELLITUS WITH DIABETIC NEUROPATHY, WITHOUT LONG-TERM CURRENT USE OF INSULIN (HCC): ICD-10-CM

## 2019-11-26 PROCEDURE — 99213 OFFICE O/P EST LOW 20 MIN: CPT | Performed by: INTERNAL MEDICINE

## 2019-11-26 NOTE — PROGRESS NOTES
CC: Luiz Melvin is a 59 y.o. male is suffering from   Chief Complaint   Patient presents with   • Wound Check     2 weeks follow up for left foot wound         SUBJECTIVE:  1. Ulcer of left foot, unspecified ulcer stage (HCC)  Luiz is here for follow-up has a history of a diabetic foot ulcer left foot followed by Dr. Muse.  Patient appears to be healing well with the use of meta honey    2. Type 2 diabetes mellitus with diabetic neuropathy, without long-term current use of insulin (HCC)  Patient with type 2 diabetes with extremely good control no change in medical therapy        Past social, family, history:   Social History     Tobacco Use   • Smoking status: Never Smoker   • Smokeless tobacco: Never Used   Substance Use Topics   • Alcohol use: Yes     Alcohol/week: 3.0 oz     Types: 6 Cans of beer per week     Comment: moderate   • Drug use: No         MEDICATIONS:    Current Outpatient Medications:   •  minocycline (MINOCIN) 50 MG Cap, Take 50 mg by mouth., Disp: , Rfl: 0  •  atorvastatin (LIPITOR) 10 MG Tab, Take 1 Tab by mouth every evening., Disp: 90 Tab, Rfl: 3  •  metFORMIN ER (GLUCOPHAGE XR) 500 MG TABLET SR 24 HR, Take 2 Tabs by mouth 2 times a day., Disp: 360 Tab, Rfl: 1  •  glipiZIDE (GLUCOTROL) 5 MG Tab, Take 1 Tab by mouth 2 times a day. TAKE 1 TAB BY MOUTH 2 TIMES A DAY., Disp: 180 Tab, Rfl: 2  •  Dulaglutide (TRULICITY) 1.5 MG/0.5ML Solution Pen-injector, Inject 1.5 mg as instructed every 7 days., Disp: 12 PEN, Rfl: 3  •  gabapentin (NEURONTIN) 300 MG Cap, Take 2 Caps by mouth 3 times a day., Disp: 540 Cap, Rfl: 3  •  Empagliflozin (JARDIANCE) 10 MG Tab, Take 10 mg by mouth every morning before breakfast., Disp: 30 Tab, Rfl: 11  •  sildenafil citrate (VIAGRA) 50 MG tablet, Take 1 Tab by mouth as needed for Erectile Dysfunction., Disp: 10 Tab, Rfl: 3  •  aspirin EC (ECOTRIN) 81 MG TBEC, Take 162 mg by mouth every day., Disp: , Rfl:   •  Multiple Vitamins-Minerals (MULTIVITAMIN  "PO), Take  by mouth every day., Disp: , Rfl:   •  Blood Glucose Monitoring Suppl SUPPLIES Misc, Test strips order: Test strips for Accucheck Daija meter. Sig: use qd and prn ssx high or low sugar #100 RF x 3, Disp: 100 Strip, Rfl: 3  •  Lancets Misc, Lancets order: Lancets for Accucheck Daija meter. Sig: use qd and prn ssx high or low sugar. #100 RF x 3, Disp: 100 Each, Rfl: 3    PROBLEMS:  Patient Active Problem List    Diagnosis Date Noted   • Diabetic ulcer of toe of left foot associated with type 2 diabetes mellitus, with muscle involvement without evidence of necrosis (HCC) 11/13/2019   • ED (erectile dysfunction) 11/14/2017   • Onychomycosis 05/10/2017   • BMI 30.0-30.9,adult 07/19/2016   • Hyperlipemia 10/27/2015   • Abnormal EKG 06/24/2015   • Cataracts, bilateral 10/13/2014   • Type 2 diabetes mellitus with diabetic neuropathy ([facility]) 10/13/2014       REVIEW OF SYSTEMS:  Gen.:  No Nausea, Vomiting, fever, Chills.  Heart: No chest pain.  Lungs:  No shortness of Breath.  Psychological: Angel unusual Anxiety depression     PHYSICAL EXAM   Constitutional: Alert, cooperative, not in acute distress.  Cardiovascular:  Rate Rhythm is regular without murmurs rubs clicks.     Thorax & Lungs: Clear to auscultation, no wheezing, rhonchi, or rales  HENT: Normocephalic, Atraumatic.  Eyes: PERRLA, EOMI, Conjunctiva normal.   Neck: Trachia is midline no swelling of the thyroid.   Lymphatic: No lymphadenopathy noted.   Musculoskeletal: Diabetic foot ulcer inspected, no change in medical therapy.  Appears to be significantly improved  Neurologic: Alert & oriented x 3, cranial nerves II through XII are intact, Normal motor function, Normal sensory function, No focal deficits noted.   Psychiatric: Affect normal, Judgment normal, Mood normal.     VITAL SIGNS:/56   Pulse 76   Temp 36.6 °C (97.9 °F) (Temporal)   Resp 12   Ht 1.778 m (5' 10\")   Wt 97.8 kg (215 lb 9.6 oz)   SpO2 97%   BMI 30.94 kg/m²     Labs: " Reviewed    Assessment:                                                     Plan:    1. Ulcer of left foot, unspecified ulcer stage (HCC)  Ongoing and significantly improved    2. Type 2 diabetes mellitus with diabetic neuropathy, without long-term current use of insulin (HCC)  No change in medical therapy

## 2020-01-21 DIAGNOSIS — E11.40 TYPE 2 DIABETES MELLITUS WITH DIABETIC NEUROPATHY, UNSPECIFIED WHETHER LONG TERM INSULIN USE (HCC): ICD-10-CM

## 2020-01-21 RX ORDER — METFORMIN HYDROCHLORIDE 500 MG/1
TABLET, EXTENDED RELEASE ORAL
Qty: 360 TAB | Refills: 1 | Status: SHIPPED | OUTPATIENT
Start: 2020-01-21 | End: 2020-09-16

## 2020-01-21 NOTE — TELEPHONE ENCOUNTER
Was the patient seen in the last year in this department? Yes  11/26/19  Does patient have an active prescription for medications requested? No     Received Request Via: Pharmacy

## 2020-01-27 ENCOUNTER — HOSPITAL ENCOUNTER (OUTPATIENT)
Facility: MEDICAL CENTER | Age: 60
End: 2020-01-27
Attending: INTERNAL MEDICINE
Payer: COMMERCIAL

## 2020-01-27 DIAGNOSIS — L97.525 DIABETIC ULCER OF TOE OF LEFT FOOT ASSOCIATED WITH TYPE 2 DIABETES MELLITUS, WITH MUSCLE INVOLVEMENT WITHOUT EVIDENCE OF NECROSIS (HCC): ICD-10-CM

## 2020-01-27 DIAGNOSIS — E11.621 DIABETIC ULCER OF TOE OF LEFT FOOT ASSOCIATED WITH TYPE 2 DIABETES MELLITUS, WITH MUSCLE INVOLVEMENT WITHOUT EVIDENCE OF NECROSIS (HCC): ICD-10-CM

## 2020-01-27 LAB
CREAT UR-MCNC: 74.4 MG/DL
MICROALBUMIN UR-MCNC: 1.3 MG/DL
MICROALBUMIN/CREAT UR: 17 MG/G (ref 0–30)

## 2020-01-27 PROCEDURE — 82043 UR ALBUMIN QUANTITATIVE: CPT

## 2020-01-27 PROCEDURE — 82570 ASSAY OF URINE CREATININE: CPT

## 2020-02-11 ENCOUNTER — OFFICE VISIT (OUTPATIENT)
Dept: MEDICAL GROUP | Facility: LAB | Age: 60
End: 2020-02-11
Payer: COMMERCIAL

## 2020-02-11 VITALS
TEMPERATURE: 97.8 F | DIASTOLIC BLOOD PRESSURE: 54 MMHG | SYSTOLIC BLOOD PRESSURE: 108 MMHG | BODY MASS INDEX: 31.41 KG/M2 | HEIGHT: 70 IN | WEIGHT: 219.4 LBS | OXYGEN SATURATION: 96 % | RESPIRATION RATE: 12 BRPM | HEART RATE: 78 BPM

## 2020-02-11 DIAGNOSIS — L97.529 DIABETIC ULCER OF TOE OF LEFT FOOT ASSOCIATED WITH TYPE 2 DIABETES MELLITUS, UNSPECIFIED ULCER STAGE (HCC): ICD-10-CM

## 2020-02-11 DIAGNOSIS — E11.40 TYPE 2 DIABETES MELLITUS WITH DIABETIC NEUROPATHY, WITHOUT LONG-TERM CURRENT USE OF INSULIN (HCC): ICD-10-CM

## 2020-02-11 DIAGNOSIS — E11.621 DIABETIC ULCER OF TOE OF LEFT FOOT ASSOCIATED WITH TYPE 2 DIABETES MELLITUS, UNSPECIFIED ULCER STAGE (HCC): ICD-10-CM

## 2020-02-11 PROCEDURE — 99213 OFFICE O/P EST LOW 20 MIN: CPT | Performed by: INTERNAL MEDICINE

## 2020-02-11 ASSESSMENT — PATIENT HEALTH QUESTIONNAIRE - PHQ9: CLINICAL INTERPRETATION OF PHQ2 SCORE: 0

## 2020-02-12 NOTE — PROGRESS NOTES
CC: Luiz Melvin is a 59 y.o. male is suffering from   Chief Complaint   Patient presents with   • Diabetes     3 months follow up         SUBJECTIVE:  1. Diabetic ulcer of toe of left foot associated with type 2 diabetes mellitus, unspecified ulcer stage (HCC)  Patient is here for follow-up is having problems with chronic ulceration left metatarsal phalangeal joint.  Patient has had this x5 months.    Intensity 6-7 / 10, quality burning, radiation localize to mcp , associated signs and symptoms lety, time component during the day, improved with rest , worse with activity.      2. Type 2 diabetes mellitus with diabetic neuropathy, without long-term current use of insulin (HCC)  Ongoing neuropathy but can feel pain through the neuropathy associate with his left foot        Past social, family, history:   Social History     Tobacco Use   • Smoking status: Never Smoker   • Smokeless tobacco: Never Used   Substance Use Topics   • Alcohol use: Yes     Alcohol/week: 3.0 oz     Types: 6 Cans of beer per week     Comment: moderate   • Drug use: No         MEDICATIONS:    Current Outpatient Medications:   •  metFORMIN ER (GLUCOPHAGE XR) 500 MG TABLET SR 24 HR, TAKE 2 TABLETS BY MOUTH TWICE A DAY, Disp: 360 Tab, Rfl: 1  •  atorvastatin (LIPITOR) 10 MG Tab, Take 1 Tab by mouth every evening., Disp: 90 Tab, Rfl: 3  •  glipiZIDE (GLUCOTROL) 5 MG Tab, Take 1 Tab by mouth 2 times a day. TAKE 1 TAB BY MOUTH 2 TIMES A DAY., Disp: 180 Tab, Rfl: 2  •  Dulaglutide (TRULICITY) 1.5 MG/0.5ML Solution Pen-injector, Inject 1.5 mg as instructed every 7 days., Disp: 12 PEN, Rfl: 3  •  gabapentin (NEURONTIN) 300 MG Cap, Take 2 Caps by mouth 3 times a day., Disp: 540 Cap, Rfl: 3  •  Empagliflozin (JARDIANCE) 10 MG Tab, Take 10 mg by mouth every morning before breakfast., Disp: 30 Tab, Rfl: 11  •  sildenafil citrate (VIAGRA) 50 MG tablet, Take 1 Tab by mouth as needed for Erectile Dysfunction., Disp: 10 Tab, Rfl: 3  •  aspirin EC  (ECOTRIN) 81 MG TBEC, Take 162 mg by mouth every day., Disp: , Rfl:   •  Multiple Vitamins-Minerals (MULTIVITAMIN PO), Take  by mouth every day., Disp: , Rfl:   •  minocycline (MINOCIN) 50 MG Cap, Take 50 mg by mouth., Disp: , Rfl: 0  •  Blood Glucose Monitoring Suppl SUPPLIES Misc, Test strips order: Test strips for Accucheck Daija meter. Sig: use qd and prn ssx high or low sugar #100 RF x 3, Disp: 100 Strip, Rfl: 3  •  Lancets Misc, Lancets order: Lancets for Accucheck Daija meter. Sig: use qd and prn ssx high or low sugar. #100 RF x 3, Disp: 100 Each, Rfl: 3    PROBLEMS:  Patient Active Problem List    Diagnosis Date Noted   • Diabetic ulcer of toe of left foot associated with type 2 diabetes mellitus, with muscle involvement without evidence of necrosis (HCC) 11/13/2019   • ED (erectile dysfunction) 11/14/2017   • Onychomycosis 05/10/2017   • BMI 30.0-30.9,adult 07/19/2016   • Hyperlipemia 10/27/2015   • Abnormal EKG 06/24/2015   • Cataracts, bilateral 10/13/2014   • Type 2 diabetes mellitus with diabetic neuropathy (HCC) 10/13/2014       REVIEW OF SYSTEMS:  Gen.:  No Nausea, Vomiting, fever, Chills.  Heart: No chest pain.  Lungs:  No shortness of Breath.  Psychological: Angel unusual Anxiety depression     PHYSICAL EXAM   Constitutional: Alert, cooperative, not in acute distress.  Cardiovascular:  Rate Rhythm is regular without murmurs rubs clicks.     Thorax & Lungs: Clear to auscultation, no wheezing, rhonchi, or rales  HENT: Normocephalic, Atraumatic.  Eyes: PERRLA, EOMI, Conjunctiva normal.   Neck: Trachia is midline no swelling of the thyroid.   Musculoskeletal: Continued problems with ulceration around metatarsal phalangeal joint left great toe.  Neurologic: Alert & oriented x 3, cranial nerves II through XII are intact, Normal motor function, Normal sensory function, No focal deficits noted.   Psychiatric: Affect normal, Judgment normal, Mood normal.     VITAL SIGNS:/54   Pulse 78   Temp 36.6 °C  "(97.8 °F) (Temporal)   Resp 12   Ht 1.778 m (5' 10\")   Wt 99.5 kg (219 lb 6.4 oz)   SpO2 96%   BMI 31.48 kg/m²     Labs: Reviewed    Assessment:                                                     Plan:    1. Diabetic ulcer of toe of left foot associated with type 2 diabetes mellitus, unspecified ulcer stage (HCC)  MRI ordered continue follow-up with podiatry  - MR-FOOT-W/O LEFT; Future  - DX-FOOT-2- LEFT; Future    2. Type 2 diabetes mellitus with diabetic neuropathy, without long-term current use of insulin (HCC)  Recheck hemoglobin A1c  - HEMOGLOBIN A1C; Future            "

## 2020-02-24 ENCOUNTER — PATIENT MESSAGE (OUTPATIENT)
Dept: MEDICAL GROUP | Facility: LAB | Age: 60
End: 2020-02-24

## 2020-03-06 DIAGNOSIS — E11.40 TYPE 2 DIABETES MELLITUS WITH DIABETIC NEUROPATHY, WITHOUT LONG-TERM CURRENT USE OF INSULIN (HCC): ICD-10-CM

## 2020-03-06 RX ORDER — DULAGLUTIDE 1.5 MG/.5ML
INJECTION, SOLUTION SUBCUTANEOUS
Qty: 6 ML | Refills: 3 | Status: SHIPPED | OUTPATIENT
Start: 2020-03-06

## 2020-03-06 NOTE — TELEPHONE ENCOUNTER
Received request via: Pharmacy    Was the patient seen in the last year in this department? Yes  2/11/20  Does the patient have an active prescription (recently filled or refills available) for medication(s) requested? No

## 2020-04-18 DIAGNOSIS — E11.40 TYPE 2 DIABETES MELLITUS WITH DIABETIC NEUROPATHY, WITHOUT LONG-TERM CURRENT USE OF INSULIN (HCC): ICD-10-CM

## 2020-04-20 RX ORDER — GABAPENTIN 300 MG/1
600 CAPSULE ORAL 3 TIMES DAILY
Qty: 540 CAP | Refills: 3 | Status: SHIPPED | OUTPATIENT
Start: 2020-04-20

## 2020-04-20 RX ORDER — GLIPIZIDE 5 MG/1
TABLET ORAL
Qty: 180 TAB | Refills: 2 | Status: SHIPPED | OUTPATIENT
Start: 2020-04-20

## 2020-06-10 ENCOUNTER — HOSPITAL ENCOUNTER (OUTPATIENT)
Facility: MEDICAL CENTER | Age: 60
End: 2020-06-10
Attending: PODIATRIST
Payer: COMMERCIAL

## 2020-06-10 PROCEDURE — 87205 SMEAR GRAM STAIN: CPT

## 2020-06-10 PROCEDURE — 87077 CULTURE AEROBIC IDENTIFY: CPT

## 2020-06-10 PROCEDURE — 87186 SC STD MICRODIL/AGAR DIL: CPT

## 2020-06-10 PROCEDURE — 87070 CULTURE OTHR SPECIMN AEROBIC: CPT

## 2020-06-11 LAB
GRAM STN SPEC: NORMAL
SIGNIFICANT IND 70042: NORMAL
SITE SITE: NORMAL
SOURCE SOURCE: NORMAL

## 2020-06-16 ENCOUNTER — HOSPITAL ENCOUNTER (OUTPATIENT)
Dept: LAB | Facility: MEDICAL CENTER | Age: 60
End: 2020-06-16
Attending: PODIATRIST
Payer: COMMERCIAL

## 2020-06-16 LAB
ALBUMIN SERPL BCP-MCNC: 4.3 G/DL (ref 3.2–4.9)
ALBUMIN/GLOB SERPL: 1.6 G/DL
ALP SERPL-CCNC: 86 U/L (ref 30–99)
ALT SERPL-CCNC: 15 U/L (ref 2–50)
ANION GAP SERPL CALC-SCNC: 14 MMOL/L (ref 7–16)
AST SERPL-CCNC: 12 U/L (ref 12–45)
BASOPHILS # BLD AUTO: 0.6 % (ref 0–1.8)
BASOPHILS # BLD: 0.03 K/UL (ref 0–0.12)
BILIRUB SERPL-MCNC: 0.5 MG/DL (ref 0.1–1.5)
BUN SERPL-MCNC: 18 MG/DL (ref 8–22)
CALCIUM SERPL-MCNC: 9 MG/DL (ref 8.5–10.5)
CHLORIDE SERPL-SCNC: 102 MMOL/L (ref 96–112)
CO2 SERPL-SCNC: 23 MMOL/L (ref 20–33)
CREAT SERPL-MCNC: 0.71 MG/DL (ref 0.5–1.4)
EOSINOPHIL # BLD AUTO: 0.25 K/UL (ref 0–0.51)
EOSINOPHIL NFR BLD: 4.8 % (ref 0–6.9)
ERYTHROCYTE [DISTWIDTH] IN BLOOD BY AUTOMATED COUNT: 39.2 FL (ref 35.9–50)
GLOBULIN SER CALC-MCNC: 2.7 G/DL (ref 1.9–3.5)
GLUCOSE SERPL-MCNC: 61 MG/DL (ref 65–99)
HCT VFR BLD AUTO: 41.2 % (ref 42–52)
HGB BLD-MCNC: 13.9 G/DL (ref 14–18)
IMM GRANULOCYTES # BLD AUTO: 0.01 K/UL (ref 0–0.11)
IMM GRANULOCYTES NFR BLD AUTO: 0.2 % (ref 0–0.9)
LYMPHOCYTES # BLD AUTO: 1.52 K/UL (ref 1–4.8)
LYMPHOCYTES NFR BLD: 29.4 % (ref 22–41)
MCH RBC QN AUTO: 30.4 PG (ref 27–33)
MCHC RBC AUTO-ENTMCNC: 33.7 G/DL (ref 33.7–35.3)
MCV RBC AUTO: 90.2 FL (ref 81.4–97.8)
MONOCYTES # BLD AUTO: 0.5 K/UL (ref 0–0.85)
MONOCYTES NFR BLD AUTO: 9.7 % (ref 0–13.4)
NEUTROPHILS # BLD AUTO: 2.86 K/UL (ref 1.82–7.42)
NEUTROPHILS NFR BLD: 55.3 % (ref 44–72)
NRBC # BLD AUTO: 0 K/UL
NRBC BLD-RTO: 0 /100 WBC
PLATELET # BLD AUTO: 193 K/UL (ref 164–446)
PMV BLD AUTO: 9.6 FL (ref 9–12.9)
POTASSIUM SERPL-SCNC: 4 MMOL/L (ref 3.6–5.5)
PROT SERPL-MCNC: 7 G/DL (ref 6–8.2)
RBC # BLD AUTO: 4.57 M/UL (ref 4.7–6.1)
SODIUM SERPL-SCNC: 139 MMOL/L (ref 135–145)
WBC # BLD AUTO: 5.2 K/UL (ref 4.8–10.8)

## 2020-06-16 PROCEDURE — 85025 COMPLETE CBC W/AUTO DIFF WBC: CPT

## 2020-06-16 PROCEDURE — 80053 COMPREHEN METABOLIC PANEL: CPT

## 2020-06-16 PROCEDURE — 36415 COLL VENOUS BLD VENIPUNCTURE: CPT

## 2020-09-15 DIAGNOSIS — E11.40 TYPE 2 DIABETES MELLITUS WITH DIABETIC NEUROPATHY, UNSPECIFIED WHETHER LONG TERM INSULIN USE (HCC): ICD-10-CM

## 2020-09-16 RX ORDER — METFORMIN HYDROCHLORIDE 500 MG/1
TABLET, EXTENDED RELEASE ORAL
Qty: 360 TAB | Refills: 2 | Status: SHIPPED | OUTPATIENT
Start: 2020-09-16

## 2020-09-16 NOTE — TELEPHONE ENCOUNTER
Received request via: Patient    Was the patient seen in the last year in this department? Yes  2/11/2020  Does the patient have an active prescription (recently filled or refills available) for medication(s) requested? No

## 2021-03-15 DIAGNOSIS — Z23 NEED FOR VACCINATION: ICD-10-CM

## 2025-07-01 NOTE — ASSESSMENT & PLAN NOTE
Only on a couple toe nails but present several years.   denies redness, swelling, abrasions of toes or feet.   Has tried one powder that OTC- didn't help.   Would like to stick to OTC stuff at this point.    no